# Patient Record
Sex: MALE | Race: WHITE | NOT HISPANIC OR LATINO | Employment: OTHER | URBAN - METROPOLITAN AREA
[De-identification: names, ages, dates, MRNs, and addresses within clinical notes are randomized per-mention and may not be internally consistent; named-entity substitution may affect disease eponyms.]

---

## 2023-10-30 DIAGNOSIS — I10 BENIGN ESSENTIAL HYPERTENSION: Primary | ICD-10-CM

## 2023-10-30 PROBLEM — R00.2 PALPITATIONS: Status: ACTIVE | Noted: 2023-10-30

## 2023-10-30 PROBLEM — E66.01 MORBIDLY OBESE (MULTI): Status: ACTIVE | Noted: 2023-10-30

## 2023-10-30 PROBLEM — E78.5 HYPERLIPIDEMIA: Status: ACTIVE | Noted: 2023-10-30

## 2023-10-30 PROBLEM — R06.02 SOB (SHORTNESS OF BREATH) ON EXERTION: Status: ACTIVE | Noted: 2023-10-30

## 2023-10-30 PROBLEM — I48.0 PAROXYSMAL ATRIAL FIBRILLATION (MULTI): Status: ACTIVE | Noted: 2023-10-30

## 2023-10-30 PROBLEM — R55 NEAR SYNCOPE: Status: ACTIVE | Noted: 2023-10-30

## 2023-10-30 PROBLEM — I48.11 LONGSTANDING PERSISTENT ATRIAL FIBRILLATION (MULTI): Status: ACTIVE | Noted: 2023-10-30

## 2023-10-30 PROBLEM — Z95.0 PACEMAKER: Status: ACTIVE | Noted: 2023-10-30

## 2023-10-30 PROBLEM — I49.5 SICK SINUS SYNDROME (MULTI): Status: ACTIVE | Noted: 2023-10-30

## 2023-10-30 RX ORDER — LISINOPRIL 20 MG/1
1 TABLET ORAL DAILY
COMMUNITY

## 2023-10-30 RX ORDER — SOTALOL HYDROCHLORIDE 120 MG/1
1 TABLET ORAL 2 TIMES DAILY
COMMUNITY
End: 2024-03-28 | Stop reason: SDUPTHER

## 2023-10-30 RX ORDER — METOPROLOL SUCCINATE 100 MG/1
100 TABLET, EXTENDED RELEASE ORAL DAILY
Qty: 90 TABLET | Refills: 3 | Status: SHIPPED | OUTPATIENT
Start: 2023-10-30 | End: 2024-10-29

## 2023-10-30 RX ORDER — BEMPEDOIC ACID AND EZETIMIBE 180; 10 MG/1; MG/1
1 TABLET, FILM COATED ORAL DAILY
COMMUNITY
End: 2023-12-20 | Stop reason: ALTCHOICE

## 2023-10-30 RX ORDER — FAMOTIDINE 20 MG/1
1 TABLET, FILM COATED ORAL 2 TIMES DAILY
COMMUNITY

## 2023-10-30 RX ORDER — DEXTROMETHORPHAN HYDROBROMIDE, GUAIFENESIN 5; 100 MG/5ML; MG/5ML
LIQUID ORAL
COMMUNITY

## 2023-10-30 RX ORDER — METOPROLOL SUCCINATE 100 MG/1
0.5 TABLET, EXTENDED RELEASE ORAL DAILY
COMMUNITY
End: 2023-10-30 | Stop reason: SDUPTHER

## 2023-10-30 RX ORDER — DIGOXIN 125 MCG
1 TABLET ORAL EVERY OTHER DAY
COMMUNITY

## 2023-10-30 RX ORDER — IBUPROFEN 600 MG/1
1 TABLET ORAL
COMMUNITY

## 2023-10-30 RX ORDER — LEVOTHYROXINE SODIUM 75 UG/1
1 TABLET ORAL DAILY
COMMUNITY

## 2023-10-30 RX ORDER — SERTRALINE HYDROCHLORIDE 100 MG/1
1 TABLET, FILM COATED ORAL DAILY
COMMUNITY

## 2023-12-20 ENCOUNTER — HOSPITAL ENCOUNTER (OUTPATIENT)
Dept: CARDIOLOGY | Facility: HOSPITAL | Age: 63
Discharge: HOME | End: 2023-12-20
Payer: MEDICARE

## 2023-12-20 ENCOUNTER — OFFICE VISIT (OUTPATIENT)
Dept: CARDIOLOGY | Facility: CLINIC | Age: 63
End: 2023-12-20
Payer: MEDICARE

## 2023-12-20 VITALS
WEIGHT: 302.2 LBS | BODY MASS INDEX: 40.05 KG/M2 | HEART RATE: 73 BPM | SYSTOLIC BLOOD PRESSURE: 140 MMHG | HEIGHT: 73 IN | DIASTOLIC BLOOD PRESSURE: 72 MMHG

## 2023-12-20 DIAGNOSIS — Z87.891 FORMER SMOKER: ICD-10-CM

## 2023-12-20 DIAGNOSIS — Z79.01 CURRENT USE OF LONG TERM ANTICOAGULATION: ICD-10-CM

## 2023-12-20 DIAGNOSIS — Z95.0 CARDIAC PACEMAKER IN SITU: ICD-10-CM

## 2023-12-20 DIAGNOSIS — Z79.899 HIGH RISK MEDICATION USE: ICD-10-CM

## 2023-12-20 DIAGNOSIS — I49.5 SINOATRIAL NODE DYSFUNCTION (MULTI): ICD-10-CM

## 2023-12-20 DIAGNOSIS — I48.11 LONGSTANDING PERSISTENT ATRIAL FIBRILLATION (MULTI): Primary | ICD-10-CM

## 2023-12-20 DIAGNOSIS — E78.5 HYPERLIPIDEMIA: ICD-10-CM

## 2023-12-20 DIAGNOSIS — I10 BENIGN ESSENTIAL HYPERTENSION: ICD-10-CM

## 2023-12-20 DIAGNOSIS — I49.5 SICK SINUS SYNDROME (MULTI): ICD-10-CM

## 2023-12-20 DIAGNOSIS — R00.2 PALPITATIONS: ICD-10-CM

## 2023-12-20 DIAGNOSIS — Z95.0 PACEMAKER: ICD-10-CM

## 2023-12-20 PROBLEM — F17.200 TOBACCO DEPENDENCE: Status: ACTIVE | Noted: 2023-12-20

## 2023-12-20 PROBLEM — N20.0 NEPHROLITHIASIS: Status: ACTIVE | Noted: 2023-12-20

## 2023-12-20 PROBLEM — F41.9 ANXIETY: Status: ACTIVE | Noted: 2023-12-20

## 2023-12-20 PROBLEM — I48.0 PAROXYSMAL ATRIAL FIBRILLATION (MULTI): Status: RESOLVED | Noted: 2023-10-30 | Resolved: 2023-12-20

## 2023-12-20 PROCEDURE — 99214 OFFICE O/P EST MOD 30 MIN: CPT | Performed by: INTERNAL MEDICINE

## 2023-12-20 PROCEDURE — 93000 ELECTROCARDIOGRAM COMPLETE: CPT | Performed by: INTERNAL MEDICINE

## 2023-12-20 PROCEDURE — 1036F TOBACCO NON-USER: CPT | Performed by: INTERNAL MEDICINE

## 2023-12-20 PROCEDURE — 93280 PM DEVICE PROGR EVAL DUAL: CPT | Performed by: INTERNAL MEDICINE

## 2023-12-20 PROCEDURE — 3008F BODY MASS INDEX DOCD: CPT | Performed by: INTERNAL MEDICINE

## 2023-12-20 PROCEDURE — 3077F SYST BP >= 140 MM HG: CPT | Performed by: INTERNAL MEDICINE

## 2023-12-20 PROCEDURE — 1159F MED LIST DOCD IN RCRD: CPT | Performed by: INTERNAL MEDICINE

## 2023-12-20 PROCEDURE — 93280 PM DEVICE PROGR EVAL DUAL: CPT

## 2023-12-20 PROCEDURE — 3078F DIAST BP <80 MM HG: CPT | Performed by: INTERNAL MEDICINE

## 2023-12-20 PROCEDURE — 93290 INTERROG DEV EVAL ICPMS IP: CPT | Performed by: INTERNAL MEDICINE

## 2023-12-20 RX ORDER — RIVAROXABAN 20 MG/1
20 TABLET, FILM COATED ORAL DAILY
COMMUNITY
Start: 2023-12-04

## 2023-12-20 RX ORDER — ASPIRIN 81 MG/1
81 TABLET ORAL DAILY
COMMUNITY
Start: 2023-08-03

## 2023-12-20 RX ORDER — HYDROCHLOROTHIAZIDE 12.5 MG/1
12.5 CAPSULE ORAL
COMMUNITY
Start: 2023-10-29

## 2023-12-20 NOTE — PATIENT INSTRUCTIONS
Patient to follow up in 6 months with HELEN Ray with device check in clinic.     No other changes today.   Continue same medications and treatments.   Patient educated on proper medication use.   Patient educated on risk factor modification.   Please bring any lab results from other providers / physicians to your next appointment.     Please bring all medicines, vitamins, and herbal supplements with you when you come to the office.     Prescriptions will not be filled unless you are compliant with your follow up appointments or have a follow up appointment scheduled as per instruction of your physician. Refills should be requested at the time of your visit.    IBenny RN am scribing for and in the presence of Dr. Ren Berger MD

## 2023-12-20 NOTE — PROGRESS NOTES
CARDIOLOGY OFFICE VISIT      CHIEF COMPLAINT  Chief Complaint   Patient presents with    Follow-up     Patient here for 6 month follow up        HISTORY OF PRESENT ILLNESS  HPI    63-year-old  male who is followed for sinus node dysfunction status post dual-chamber pacemaker implant on October 19, 2018, paroxysmal atrial fibrillation controlled on a combination of sotalol, digoxin, metoprolol, and anticoagulated with Xarelto, hypothyroidism on replacement therapy, dyslipidemia on statin, hypertension and class II obesity.    Since the last office visit he has been doing well.  Denies any symptoms of chest pain or shortness of breath or palpitations.    Device interrogation performed today at the device clinic shows battery longevity 8 years.  No evidence of atrial fibrillation with 1 brief episode of nonsustained ventricular tachycardia lasting 2 seconds with duration rate of 170 bpm asymptomatic.    EKG performed today shows atrial paced rhythm rate of 73 bpm QRS duration 86 ms QT corrected 151 ms.  Rhythm strip shows the same pattern.        Past Medical History  History reviewed. No pertinent past medical history.    Social History  Social History     Tobacco Use    Smoking status: Former     Types: Cigarettes     Passive exposure: Never    Smokeless tobacco: Never   Substance Use Topics    Alcohol use: Not Currently    Drug use: Never       Family History   No family history on file.     Allergies:  No Known Allergies     Outpatient Medications:  Current Outpatient Medications   Medication Instructions    acetaminophen (Tylenol Arthritis Pain) 650 mg ER tablet take 2 tablets as needed    aspirin 81 mg, oral, Daily    digoxin (Lanoxin) 125 MCG tablet 1 tablet, oral, Every other day    famotidine (Pepcid) 20 mg tablet 1 tablet, oral, 2 times daily    hydroCHLOROthiazide (MICROZIDE) 12.5 mg, oral, Daily before breakfast    ibuprofen 600 mg tablet 1 tablet, oral, 2 times daily with meals    levothyroxine  (Synthroid, Levoxyl) 75 mcg tablet 1 tablet, oral, Daily    lisinopril 20 mg tablet 1 tablet, oral, Daily    metoprolol succinate XL (TOPROL-XL) 100 mg, oral, Daily    sertraline (Zoloft) 100 mg tablet 1 tablet, oral, Daily    sotalol (Betapace) 120 mg tablet 1 tablet, oral, 2 times daily    Xarelto 20 mg, oral, Daily          REVIEW OF SYSTEMS  Review of Systems   All other systems reviewed and are negative.        VITALS  Vitals:    12/20/23 1215   BP: 140/72   Pulse: 73       PHYSICAL EXAM  Vitals reviewed.   Constitutional:       Appearance: Normal and healthy appearance. Well-developed and not in distress.   Eyes:      Conjunctiva/sclera: Conjunctivae normal.      Pupils: Pupils are equal, round, and reactive to light.   Neck:      Vascular: No JVR. JVD normal.   Pulmonary:      Effort: Pulmonary effort is normal.      Breath sounds: Normal breath sounds. No wheezing. No rhonchi. No rales.   Chest:      Chest wall: Not tender to palpatation.          Comments: Left sided device pocket- healed and well approximated. No lump or hematoma     Cardiovascular:      PMI at left midclavicular line. Normal rate. Regular rhythm. Normal S1. Normal S2.       Murmurs: There is no murmur.      No gallop.  No click. No rub.   Pulses:     Intact distal pulses.   Edema:     Peripheral edema absent.   Abdominal:      Tenderness: There is no abdominal tenderness.   Musculoskeletal: Normal range of motion.         General: No tenderness.      Cervical back: Normal range of motion. Skin:     General: Skin is warm and dry.   Neurological:      General: No focal deficit present.      Mental Status: Alert and oriented to person, place and time.   Psychiatric:         Behavior: Behavior is cooperative.           ASSESSMENT AND PLAN    Clinical impressions:  1. Sinus node dysfunction status post dual-chamber pacemaker implant ([employer] Neha XT DR MRI) on October 19, 2018.  2. Paroxysmal atrial tachycardia controlled on sotalol and  anticoagulated with Xarelto.  3. Nonsustained ventricular tachycardia on sotalol.  4. Hypothyroidism on replacement therapy.  5. Hypertension, controlled   6. Class II obesity with a BMI of 39.60.  7. Left heart catheterization dated June 22, 2018 revealing mild diffuse disease of the mid LAD and first diagonal with recommendation for medical management and ejection fraction of 50%.  8. 2D echocardiogram dated March 18, 2020 revealing an ejection fraction of 60% with trace TR and left atrial enlargement the left atrial size of 4.1 cm.      Plan-recommendations    From the electrophysiology standpoint he is doing well.  Will continue with current medical therapy that includes high risk medication (sotalol) Xarelto therapy and beta-blocker therapy.    Continue with digoxin therapy.    Follow my office every 6 months or sooner if needed.    Follow device clinic as scheduled to continue watching the burden of atrial fibrillation by device interrogations.    Risk factor modification and lifestyle modification discussed with patient. Diet , exercise and hydration discussed with patient.    I have personally review with patient during this office visit, laboratory data, echocardiogram results, stress test results, Holter-event monitor results prior and after the last electrophysiology visit. All questions has been answered.    Please excuse any errors in grammar or translation related to this dictation.  Voice recognition software was utilized to prepare this document.

## 2024-03-27 ENCOUNTER — HOSPITAL ENCOUNTER (OUTPATIENT)
Dept: CARDIOLOGY | Facility: HOSPITAL | Age: 64
Discharge: HOME | End: 2024-03-27
Payer: MEDICARE

## 2024-03-27 DIAGNOSIS — Z95.0 PACEMAKER: ICD-10-CM

## 2024-03-27 PROCEDURE — 93296 REM INTERROG EVL PM/IDS: CPT

## 2024-03-27 PROCEDURE — 93294 REM INTERROG EVL PM/LDLS PM: CPT | Performed by: INTERNAL MEDICINE

## 2024-03-28 DIAGNOSIS — I48.11 LONGSTANDING PERSISTENT ATRIAL FIBRILLATION (MULTI): ICD-10-CM

## 2024-03-28 RX ORDER — SOTALOL HYDROCHLORIDE 120 MG/1
120 TABLET ORAL 2 TIMES DAILY
Qty: 180 TABLET | Refills: 1 | Status: SHIPPED | OUTPATIENT
Start: 2024-03-28

## 2024-06-19 ENCOUNTER — HOSPITAL ENCOUNTER (OUTPATIENT)
Dept: CARDIOLOGY | Facility: HOSPITAL | Age: 64
Discharge: HOME | End: 2024-06-19
Payer: MEDICARE

## 2024-06-19 ENCOUNTER — APPOINTMENT (OUTPATIENT)
Dept: CARDIOLOGY | Facility: CLINIC | Age: 64
End: 2024-06-19
Payer: MEDICARE

## 2024-06-19 VITALS
SYSTOLIC BLOOD PRESSURE: 122 MMHG | HEIGHT: 73 IN | BODY MASS INDEX: 41.22 KG/M2 | DIASTOLIC BLOOD PRESSURE: 78 MMHG | WEIGHT: 311 LBS | HEART RATE: 80 BPM

## 2024-06-19 DIAGNOSIS — I48.11 LONGSTANDING PERSISTENT ATRIAL FIBRILLATION (MULTI): ICD-10-CM

## 2024-06-19 DIAGNOSIS — I10 BENIGN ESSENTIAL HYPERTENSION: ICD-10-CM

## 2024-06-19 DIAGNOSIS — R00.2 PALPITATIONS: ICD-10-CM

## 2024-06-19 DIAGNOSIS — R06.02 SOB (SHORTNESS OF BREATH) ON EXERTION: ICD-10-CM

## 2024-06-19 DIAGNOSIS — E78.2 MIXED HYPERLIPIDEMIA: ICD-10-CM

## 2024-06-19 DIAGNOSIS — Z95.0 PACEMAKER: ICD-10-CM

## 2024-06-19 DIAGNOSIS — Z95.0 PACEMAKER: Primary | ICD-10-CM

## 2024-06-19 DIAGNOSIS — E66.01 MORBIDLY OBESE (MULTI): ICD-10-CM

## 2024-06-19 DIAGNOSIS — I49.5 SICK SINUS SYNDROME (MULTI): ICD-10-CM

## 2024-06-19 PROCEDURE — 1036F TOBACCO NON-USER: CPT | Performed by: NURSE PRACTITIONER

## 2024-06-19 PROCEDURE — 93280 PM DEVICE PROGR EVAL DUAL: CPT

## 2024-06-19 PROCEDURE — 1159F MED LIST DOCD IN RCRD: CPT | Performed by: NURSE PRACTITIONER

## 2024-06-19 PROCEDURE — 93280 PM DEVICE PROGR EVAL DUAL: CPT | Performed by: INTERNAL MEDICINE

## 2024-06-19 PROCEDURE — 99214 OFFICE O/P EST MOD 30 MIN: CPT | Performed by: NURSE PRACTITIONER

## 2024-06-19 PROCEDURE — 3078F DIAST BP <80 MM HG: CPT | Performed by: NURSE PRACTITIONER

## 2024-06-19 PROCEDURE — 1160F RVW MEDS BY RX/DR IN RCRD: CPT | Performed by: NURSE PRACTITIONER

## 2024-06-19 PROCEDURE — 3008F BODY MASS INDEX DOCD: CPT | Performed by: NURSE PRACTITIONER

## 2024-06-19 PROCEDURE — 93000 ELECTROCARDIOGRAM COMPLETE: CPT | Mod: DISTINCT PROCEDURAL SERVICE | Performed by: INTERNAL MEDICINE

## 2024-06-19 PROCEDURE — 3074F SYST BP LT 130 MM HG: CPT | Performed by: NURSE PRACTITIONER

## 2024-06-19 RX ORDER — FLUTICASONE FUROATE, UMECLIDINIUM BROMIDE AND VILANTEROL TRIFENATATE 100; 62.5; 25 UG/1; UG/1; UG/1
1 POWDER RESPIRATORY (INHALATION) DAILY
COMMUNITY
Start: 2024-03-15

## 2024-06-19 NOTE — PROGRESS NOTES
"CARDIOLOGY OFFICE VISIT      CHIEF COMPLAINT  Chief Complaint   Patient presents with    Device Check     Chief complaint: \"I cannot seem to lose any weight.\"  HISTORY OF PRESENT ILLNESS  HPI  History: The patient is a 63-year-old  male who is followed for sinus node dysfunction status post dual-chamber pacemaker implant on October 19, 2018, paroxysmal atrial fibrillation controlled on a combination of sotalol, metoprolol, digoxin, and anticoagulated with Xarelto, hypothyroidism on replacement therapy and dyslipidemia on statin.  He states he was placed on Ozempic for management of diabetes and lost 10 pounds initially but his weight has plateaued and he has not lost any additional weight.  He denies chest pain, palpitations, dizziness, lightheadedness, shortness of breath, abdominal distention, or right lower extremity swelling.  He states that he did have surgery on his left foot and does occasionally experience swelling which is relieved with elevating the extremity.  Past Medical History  History reviewed. No pertinent past medical history.    Social History  Social History     Tobacco Use    Smoking status: Former     Types: Cigarettes     Passive exposure: Never    Smokeless tobacco: Never   Substance Use Topics    Alcohol use: Not Currently    Drug use: Never       Family History     Family History   Problem Relation Name Age of Onset    Atrial fibrillation Mother      Atrial fibrillation Brother          Allergies:  No Known Allergies     Outpatient Medications:  Current Outpatient Medications   Medication Instructions    acetaminophen (Tylenol Arthritis Pain) 650 mg ER tablet take 2 tablets as needed    aspirin 81 mg, oral, Daily    digoxin (Lanoxin) 125 MCG tablet 1 tablet, oral, Every other day    famotidine (Pepcid) 20 mg tablet 1 tablet, oral, 2 times daily    hydroCHLOROthiazide (MICROZIDE) 12.5 mg, oral, Daily before breakfast    ibuprofen 600 mg tablet 1 tablet, oral, 2 times daily (morning " and late afternoon)    levothyroxine (Synthroid, Levoxyl) 75 mcg tablet 1 tablet, oral, Daily    lisinopril 20 mg tablet 1 tablet, oral, Daily    metoprolol succinate XL (TOPROL-XL) 100 mg, oral, Daily    sertraline (Zoloft) 100 mg tablet 1 tablet, oral, Daily    sotalol (BETAPACE) 120 mg, oral, 2 times daily    Trelegy Ellipta 100-62.5-25 mcg blister with device 1 puff, inhalation, Daily    Xarelto 20 mg, oral, Daily          REVIEW OF SYSTEMS  Review of Systems   All other systems reviewed and are negative.        VITALS  Vitals:    06/19/24 1027   BP: 122/78   Pulse: 80       PHYSICAL EXAM  Vitals and nursing note reviewed.   Constitutional:       Appearance: Normal appearance.   HENT:      Head: Normocephalic.   Neck:      Vascular: No JVD. Carotid upstrokes II/IV.  Cardiovascular:      Rate and Rhythm: Normal rate and regular rhythm.      Pulses: Normal pulses.      Heart sounds: Normal S1 S2, no S3 S4.  No murmurs or rubs.  Pulmonary:      Effort: Pulmonary effort is normal. Respirations regular and nonlabored.     Breath sounds: Clear to auscultation posterior laterally.  Abdominal:      General: Bowel sounds are normal.      Palpations: Abdomen is soft.   Musculoskeletal:         General: Normal range of motion.      Cervical back: Normal range of motion.   Skin:     General: Skin is warm and dry.   Neurological:      General: No focal deficit present.      Mental Status: Alert and oriented to person, place, and time.      Motor: Motor function is intact.   Psychiatric:         Attention and Perception: Attention and perception normal.         Mood and Affect: Mood and affect normal.         Speech: Speech normal.         Behavior: Behavior normal. Behavior is cooperative.         Thought Content: Thought content normal.         Cognition and Memory: Cognition and memory normal.     Labs and testing: Twelve-lead EKG reveals atrial pacing and ventricular tracking at 80 bpm, prolonged AV conduction with a WA  interval of 224 ms, QRS durations 88 ms,  ms, QTc 440 ms.  No acute ischemic changes or infarct patterns are noted.  Pacemaker interrogation dated June 19, 2024 reveals atrial pacing 78.23% and ventricular pacing 1.5% percent.  1 AT/AF episode was noted on May 2, 2024 lasting 53 seconds in duration.  The AT/AF burden is 5.7%.  1 SVT event was noted on May 24, 2024 lasting 2 seconds at a rate of 161 bpm.  Good sensing and capture thresholds.  Estimated battery longevity is 7 years and 7 months.  2D echo dated July 8, 2023 revealed an ejection fraction of 50 to 55%, moderate concentric LVH, mild left atrial enlargement, trace TR.      ASSESSMENT AND PLAN    Clinical impressions:  1. Sinus node dysfunction status post dual-chamber pacemaker implant ([employer] Neha XT DR MRI) on October 19, 2018.  2. Paroxysmal atrial tachycardia controlled on sotalol and anticoagulated with Xarelto.  3. Nonsustained ventricular tachycardia on sotalol.  4. Hypothyroidism on replacement therapy.  5. Hypertension, controlled with a blood pressure today 122/78.  6. Class II obesity with a BMI of 41.03.  7. Left heart catheterization dated June 22, 2018 revealing mild diffuse disease of the mid LAD and first diagonal with recommendation for medical management and ejection fraction of 50%.  8. 2D echocardiogram dated July 8, 2023 revealed an ejection fraction of 50 to 55%, moderate concentric LVH, mild left atrial enlargement, trace tricuspid regurgitation.  Recommendations:  1.  Continue current medications as prescribed.  2.  Lifestyle modifications were discussed for arrhythmia management.  Patient was encouraged to increase water consumption to 64 ounces per day.  Patient states that he does not drink water.  He reports that he drinks tea, sports drinks and milk.  We discussed restricting sports drinks and tea.  Activity and exercise was also encouraged for weight loss.  Patient was instructed to try eating 6 small meals per day  as opposed to 3 large meals and snacking.  We discussed healthy snacks as opposed to chips.  3.  Obtain a remote device interrogation on September 25, 2024 and in clinic check in 6 months prior to the office visit with Dr. Berger.  4.  Follow-up in office with Dr. Berger in 6 months or sooner if needed.    Evaluation and note by Flor Hebert, CNP  **Please excuse any errors in grammar or translation related to this dictation.  Voice recognition software was utilized to prepare this document.**

## 2024-08-22 ENCOUNTER — HOSPITAL ENCOUNTER (EMERGENCY)
Age: 64
Discharge: HOME | End: 2024-08-22
Payer: MEDICARE

## 2024-08-22 VITALS — OXYGEN SATURATION: 97 % | DIASTOLIC BLOOD PRESSURE: 98 MMHG | SYSTOLIC BLOOD PRESSURE: 162 MMHG | HEART RATE: 70 BPM

## 2024-08-22 VITALS
OXYGEN SATURATION: 97 % | HEART RATE: 98 BPM | SYSTOLIC BLOOD PRESSURE: 168 MMHG | DIASTOLIC BLOOD PRESSURE: 112 MMHG | TEMPERATURE: 97.52 F

## 2024-08-22 VITALS — BODY MASS INDEX: 40.9 KG/M2
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
## 2024-08-22 DIAGNOSIS — J32.9: ICD-10-CM

## 2024-08-22 DIAGNOSIS — S01.01XA: Primary | ICD-10-CM

## 2024-08-22 PROCEDURE — 99284 EMERGENCY DEPT VISIT MOD MDM: CPT

## 2024-08-22 PROCEDURE — 12001 RPR S/N/AX/GEN/TRNK 2.5CM/<: CPT

## 2024-08-22 PROCEDURE — 70450 CT HEAD/BRAIN W/O DYE: CPT

## 2024-08-22 PROCEDURE — 72125 CT NECK SPINE W/O DYE: CPT

## 2024-08-22 NOTE — CT_ITS
The [address] [address] 
     [phone] 
  
  
Patient Name: 
BEBO HADDAD 
  
MRN: [id number]    YOB: 1960    Sex: M 
Assigned Patient Location: ER 
Current Patient Location: ED.MAIN 
Accession/Order Number: [id number] 
Exam Date: 8/22/2024  04:52    Report Date: 8/22/2024  05:20 
  
At the request of: 
MERLYN MIRANDA   
  
Procedure:  CT head/brain wo con 
  
EXAMINATION: CT head/brain wo con  
  
HISTORY: injury 
  
COMPARISON: No relevant comparison available.  
  
TECHNIQUE: Axial CT images were obtained without IV contrast. Dose reduction  
techniques were achieved by using automated exposure control and/or adjustment  
  
of mA and/or kV according to patient size and/or use of iterative  
reconstruction technique. 
  
FINDINGS:  
BRAIN: No edema, hemorrhage, mass, acute infarction, or inappropriate atrophy.  
  
CSF SPACES: No hydrocephalus, subarachnoid hemorrhage, or mass. Appropriate  
for  
age.  
SKULL: No fracture, mass, or other significant visible lesion.  
SINUSES: Complete consolidation of right sphenoid sinus.  
ORBITS: No appreciable abnormality on the limited views.  
OTHER: Mild subcutaneous bruising/edema midline posterior to the vertex.  
  
ORDER #: [id number] CT/CT head/brain wo con  
IMPRESSION:   
   
1. No intracranial hemorrhage or acute abnormality brain.  
2. No fracture of the calvarium.  
3. Posterior midline scalp mild bruising/edema.  
4. Complete opacification the right sphenoid sinus; acute versus chronic   
sinusitis.  
   
   
Electronically authenticated by: APPLE ARZOLA   Date: 8/22/2024  05:20

## 2024-08-22 NOTE — CT_ITS
The [address] [address] 
     [phone] 
  
  
Patient Name: 
BEBO HADDAD 
  
MRN: [id number]    YOB: 1960    Sex: M 
Assigned Patient Location: ED.MAIN 
Current Patient Location:  
Accession/Order Number: [id number] 
Exam Date: 8/22/2024  04:52    Report Date: 8/22/2024  05:25 
  
At the request of: 
MERLYN MIRANDA   
  
Procedure:  CT cervical spine wo con 
  
EXAMINATION: CT cervical spine wo con  
  
HISTORY: injury 
  
COMPARISON: No relevant comparison available.  
  
TECHNIQUE: Axial, Coronal, and Sagittal images were created without IV  
contrast. Dose reduction techniques were achieved by using automated exposure  
control and/or adjustment of mA and/or kV according to patient size and/or use  
  
of iterative reconstruction technique. 
  
FINDINGS:  
VERTEBRAL BODIES: Reversal normal lordotic curvature. No fracture. Minimal  
grade 1 anterolisthesis of C4 on 5.  
FACET JOINTS: Multilevel mild degenerative facet arthropathy. No disruption or  
  
abnormal widening. 
DISCS: Moderate narrowing C5-C6. Multilevel mild narrowing.  
CENTRAL CANAL: No appreciable significant spinal stenosis or evidence of  
hemorrhage. 
PARASPINAL AREA: No visible mass.  
  
ORDER #: [id number] CT/CT cervical spine wo con  
IMPRESSION:   
   
1. Reversal normal lordotic curvature; positioning versus muscle spasm.  
2. No appreciable acute abnormality.  
3. Multilevel mild to moderate degenerative changes.   
   
   
Electronically authenticated by: APPLE ARZOLA   Date: 8/22/2024  05:25

## 2024-08-22 NOTE — ED_ITS
HPI    
HPI - Fall    
General    
Chief Complaint: Fall    
Stated Complaint: FALL HEAD INJURY    
Time Seen by Provider: 08/22/24 04:31    
Source: patient    
Mode of arrival: walk-in    
Limitations: no limitations    
History of Present Illness    
HPI Narrative:     
fell out of bed and struck back of his head. lac to occipital  scalp. tetanus   
UTD. states he was dreaming and this is why he fell out of bed. No complaint of   
headache or neck pain. no numbness or extremity weakness    
Related Data    
                                    Allergies    
    
    
    
Allergy/AdvReac Type Severity Reaction Status Date / Time    
     
No Known Drug Allergies Allergy   Verified 08/22/24 04:20    
    
    
    
    
Opioid HPI    
Opioid Management    
Most Recent Pain and Opioid Data:     
2    
    
                                        No Data to Display    
    
    
    
Review of Systems    
2    
    
ROS0      
    
 Status of ROS                          10 or more systems reviewed and unremark    
able except as noted in     
history and below       
    
    
Exam    
Constitutional    
Vital Signs, click to edit/add:     
    
                                Last Vital Signs    
    
    
    
Temp  97.6 F   08/22/24 04:15    
     
Pulse  98 H  08/22/24 04:15    
     
Resp  20   08/22/24 04:15    
     
BP  168/112 H  08/22/24 04:15    
     
Pulse Ox  97   08/22/24 04:15    
     
O2 Del Method  Room Air  08/22/24 04:15    
    
    
    
    
Common normals: no apparent distress, average body habitus, oriented x3, no   
limitations, healthy appearing, alert and well nourished    
[facility]    
Head images: 2    
    
 1. occipital lac    
    
    
Eye    
Common normals: PERRL and EOMs intact bilaterally    
Respiratory    
Common normals: normal respiratory effort, no retractions, no use of accessory   
muscles and clear to auscultation bilaterally    
Cardio    
Common normals: regular rate, regular rhythm, S1 normal heart sound and S2   
normal heart sound    
Extremity    
Common normals: normal to inspection and full ROM    
Neuro    
Common normals: oriented x3, CN's II-XII intact bilaterally, moves all   
extremities and no focal motor deficits    
Psych    
Appearance: grossly normal    
    
Course    
Vital Signs    
Vital signs:     
    
                                   Vital Signs    
    
    
    
Temperature  97.6 F   08/22/24 04:15    
     
Pulse Rate  98 H  08/22/24 04:15    
     
Respiratory Rate  20   08/22/24 04:15    
     
Blood Pressure  168/112 H  08/22/24 04:15    
     
Pulse Oximetry  97   08/22/24 04:15    
     
Oxygen Delivery Method  Room Air  08/22/24 04:15    
    
    
                                            
    
    
    
Temperature  97.6 F   08/22/24 04:15    
     
Pulse Rate  98 H  08/22/24 04:15    
     
Respiratory Rate  20   08/22/24 04:15    
     
Blood Pressure  168/112 H  08/22/24 04:15    
     
Pulse Oximetry  97   08/22/24 04:15    
     
Oxygen Delivery Method  Room Air  08/22/24 04:15    
    
    
    
    
    
MDM - Fall    
MDM Narrative    
Medical decision making narrative:     
presented after he fell out of bed at home and lac the the back of his scalp.   
lac repaired. CTs with findings of sinusitis. Patient informed and discharged   
home with augmentin    
    
Discharge Plan    
Discharge    
Stand Alone Forms:  Work/School Release, Portal Instructions    
    
Chief Complaint: Fall    
    
Clinical Impression:    
 Laceration of scalp, Sinusitis    
    
    
Patient Disposition: Home, Self-Care    
    
Print Language: English    
    
Instructions:  Laceration (ED), Sinusitis (ED)    
    
Additional Instructions:    
have staples removed in 10 days.     
    
Referrals:    
ANGELA JAMA [Primary Care Provider] - 1 week    
    
    
Procedures    
ED Procedure Instructions    
Procedures    
Procedures:     
1.5cm superficial occipital scalp lac.    
site cleaned with betadine and rinsed with saline. closed with 2 staples

## 2024-08-22 NOTE — ED.FALL1
HPI
HPI - Fall
General
Chief Complaint: Fall
Stated Complaint: FALL HEAD INJURY
Time Seen by Provider: 08/22/24 04:31
Source: patient
Mode of arrival: walk-in
Limitations: no limitations
History of Present Illness
HPI Narrative: 
fell out of bed and struck back of his head. lac to occipital  scalp. tetanus UTD. states he was dreaming and this is why he fell out of bed. No complaint of headache or neck pain. no numbness or extremity weakness
Related Data
Allergies

Allergy/AdvReac Type Severity Reaction Status Date / Time
No Known Drug Allergies Allergy   Verified 08/22/24 04:20



Opioid HPI
Opioid Management
Most Recent Pain and Opioid Data: 
      No Data to Display


Review of Systems
ROS  
 Status of ROS 10 or more systems reviewed and unremarkable except as noted in history and below   

Exam
Constitutional
Vital Signs, click to edit/add: 

Last Vital Signs

Temp  97.6 F   08/22/24 04:15
Pulse  98 H  08/22/24 04:15
Resp  20   08/22/24 04:15
BP  168/112 H  08/22/24 04:15
Pulse Ox  97   08/22/24 04:15
O2 Del Method  Room Air  08/22/24 04:15



Common normals: no apparent distress, average body habitus, oriented x3, no limitations, healthy appearing, alert and well nourished
[facility]
Head images: 
 1. occipital lac

Eye
Common normals: PERRL and EOMs intact bilaterally
Respiratory
Common normals: normal respiratory effort, no retractions, no use of accessory muscles and clear to auscultation bilaterally
Cardio
Common normals: regular rate, regular rhythm, S1 normal heart sound and S2 normal heart sound
Extremity
Common normals: normal to inspection and full ROM
Neuro
Common normals: oriented x3, CN's II-XII intact bilaterally, moves all extremities and no focal motor deficits
Psych
Appearance: grossly normal

Course
Vital Signs
Vital signs: 

Vital Signs

Temperature  97.6 F   08/22/24 04:15
Pulse Rate  98 H  08/22/24 04:15
Respiratory Rate  20   08/22/24 04:15
Blood Pressure  168/112 H  08/22/24 04:15
Pulse Oximetry  97   08/22/24 04:15
Oxygen Delivery Method  Room Air  08/22/24 04:15



Temperature  97.6 F   08/22/24 04:15
Pulse Rate  98 H  08/22/24 04:15
Respiratory Rate  20   08/22/24 04:15
Blood Pressure  168/112 H  08/22/24 04:15
Pulse Oximetry  97   08/22/24 04:15
Oxygen Delivery Method  Room Air  08/22/24 04:15




MDM - Fall
MDM Narrative
Medical decision making narrative: 
presented after he fell out of bed at home and lac the the back of his scalp. lac repaired. CTs with findings of sinusitis. Patient informed and discharged home with augmentin

Discharge Plan
Discharge
Stand Alone Forms:  Work/School Release, Portal Instructions

Chief Complaint: Fall

Clinical Impression:
 Laceration of scalp, Sinusitis


Patient Disposition: Home, Self-Care

Print Language: English

Instructions:  Laceration (ED), Sinusitis (ED)

Additional Instructions:
have staples removed in 10 days. 

Referrals:
ANGELA JAMA [Primary Care Provider] - 1 week


Procedures
ED Procedure Instructions
Procedures
Procedures: 
1.5cm superficial occipital scalp lac.
site cleaned with betadine and rinsed with saline. closed with 2 staples

## 2024-09-23 DIAGNOSIS — I48.11 LONGSTANDING PERSISTENT ATRIAL FIBRILLATION (MULTI): Primary | ICD-10-CM

## 2024-09-23 RX ORDER — RIVAROXABAN 20 MG/1
20 TABLET, FILM COATED ORAL DAILY
Qty: 90 TABLET | Refills: 3 | Status: SHIPPED | OUTPATIENT
Start: 2024-09-23

## 2024-09-23 RX ORDER — SOTALOL HYDROCHLORIDE 120 MG/1
120 TABLET ORAL 2 TIMES DAILY
Qty: 180 TABLET | Refills: 3 | Status: SHIPPED | OUTPATIENT
Start: 2024-09-23

## 2024-09-23 NOTE — TELEPHONE ENCOUNTER
Patient's wife called requesting refills on sotalol 120mg twice daily and xarelto 20mg daily. Order placed and sent to provider for signature.

## 2024-09-25 ENCOUNTER — HOSPITAL ENCOUNTER (OUTPATIENT)
Dept: CARDIOLOGY | Facility: HOSPITAL | Age: 64
Discharge: HOME | End: 2024-09-25
Payer: MEDICARE

## 2024-09-25 DIAGNOSIS — Z95.0 CARDIAC PACEMAKER IN SITU: Primary | ICD-10-CM

## 2024-09-25 DIAGNOSIS — I49.5 SINOATRIAL NODE DYSFUNCTION (MULTI): ICD-10-CM

## 2024-09-25 DIAGNOSIS — Z95.0 CARDIAC PACEMAKER IN SITU: ICD-10-CM

## 2024-09-25 PROCEDURE — 93296 REM INTERROG EVL PM/IDS: CPT

## 2024-09-25 PROCEDURE — 93294 REM INTERROG EVL PM/LDLS PM: CPT | Performed by: INTERNAL MEDICINE

## 2024-10-22 DIAGNOSIS — I10 BENIGN ESSENTIAL HYPERTENSION: ICD-10-CM

## 2024-10-22 RX ORDER — METOPROLOL SUCCINATE 100 MG/1
100 TABLET, EXTENDED RELEASE ORAL DAILY
Qty: 90 TABLET | Refills: 3 | Status: SHIPPED | OUTPATIENT
Start: 2024-10-22 | End: 2025-10-22

## 2024-10-22 NOTE — TELEPHONE ENCOUNTER
Received request for prescription refill for patient.  Patient follows with Dr. Ren Berger MD and MARIE Hernandez-CNP     Request is for metoprolol  Is patient currently on medication- yes    Last OV- 6/19/24  Next OV- 12/18/24    Pended for signing and sent to provider.

## 2024-11-04 DIAGNOSIS — I48.11 LONGSTANDING PERSISTENT ATRIAL FIBRILLATION (MULTI): ICD-10-CM

## 2024-11-04 RX ORDER — DIGOXIN 125 MCG
125 TABLET ORAL EVERY OTHER DAY
Qty: 45 TABLET | Refills: 3 | Status: SHIPPED | OUTPATIENT
Start: 2024-11-04 | End: 2025-11-04

## 2024-11-04 NOTE — TELEPHONE ENCOUNTER
Received request for prescription refill for patient.  Patient follows with Dr. Ren Berger MD     Request is for digoxin   Is patient currently on medication- yes    Last OV- 6/19/24  Next OV- 12/18/24    Pended for signing and sent to provider.

## 2024-12-18 ENCOUNTER — HOSPITAL ENCOUNTER (OUTPATIENT)
Dept: CARDIOLOGY | Facility: HOSPITAL | Age: 64
Discharge: HOME | End: 2024-12-18
Payer: MEDICARE

## 2024-12-18 ENCOUNTER — APPOINTMENT (OUTPATIENT)
Dept: CARDIOLOGY | Facility: CLINIC | Age: 64
End: 2024-12-18
Payer: MEDICARE

## 2024-12-18 VITALS
HEIGHT: 73 IN | SYSTOLIC BLOOD PRESSURE: 130 MMHG | WEIGHT: 313.5 LBS | HEART RATE: 70 BPM | DIASTOLIC BLOOD PRESSURE: 70 MMHG | BODY MASS INDEX: 41.55 KG/M2

## 2024-12-18 DIAGNOSIS — Z95.0 PACEMAKER: ICD-10-CM

## 2024-12-18 DIAGNOSIS — Z87.891 FORMER SMOKER: ICD-10-CM

## 2024-12-18 DIAGNOSIS — I48.11 LONGSTANDING PERSISTENT ATRIAL FIBRILLATION (MULTI): Primary | ICD-10-CM

## 2024-12-18 DIAGNOSIS — I48.11 LONGSTANDING PERSISTENT ATRIAL FIBRILLATION (MULTI): ICD-10-CM

## 2024-12-18 DIAGNOSIS — I49.5 SICK SINUS SYNDROME (MULTI): ICD-10-CM

## 2024-12-18 PROBLEM — F17.200 TOBACCO DEPENDENCE: Status: RESOLVED | Noted: 2023-12-20 | Resolved: 2024-12-18

## 2024-12-18 PROCEDURE — 1036F TOBACCO NON-USER: CPT | Performed by: INTERNAL MEDICINE

## 2024-12-18 PROCEDURE — 93280 PM DEVICE PROGR EVAL DUAL: CPT | Performed by: INTERNAL MEDICINE

## 2024-12-18 PROCEDURE — 3078F DIAST BP <80 MM HG: CPT | Performed by: INTERNAL MEDICINE

## 2024-12-18 PROCEDURE — 3008F BODY MASS INDEX DOCD: CPT | Performed by: INTERNAL MEDICINE

## 2024-12-18 PROCEDURE — 93000 ELECTROCARDIOGRAM COMPLETE: CPT | Mod: DISTINCT PROCEDURAL SERVICE | Performed by: INTERNAL MEDICINE

## 2024-12-18 PROCEDURE — 1159F MED LIST DOCD IN RCRD: CPT | Performed by: INTERNAL MEDICINE

## 2024-12-18 PROCEDURE — 93280 PM DEVICE PROGR EVAL DUAL: CPT

## 2024-12-18 PROCEDURE — 99214 OFFICE O/P EST MOD 30 MIN: CPT | Performed by: INTERNAL MEDICINE

## 2024-12-18 PROCEDURE — 3075F SYST BP GE 130 - 139MM HG: CPT | Performed by: INTERNAL MEDICINE

## 2024-12-18 RX ORDER — ATORVASTATIN CALCIUM 40 MG/1
40 TABLET, FILM COATED ORAL NIGHTLY
COMMUNITY
Start: 2024-11-04

## 2024-12-18 ASSESSMENT — ENCOUNTER SYMPTOMS
COUGH: 0
SHORTNESS OF BREATH: 0
IRREGULAR HEARTBEAT: 0
WHEEZING: 0
SYNCOPE: 0
NEAR-SYNCOPE: 0
ORTHOPNEA: 0
PND: 0
CLAUDICATION: 0
CHILLS: 1
CARDIOVASCULAR NEGATIVE: 1
SNORING: 0

## 2024-12-18 NOTE — PATIENT INSTRUCTIONS
Keep a log of your blood pressure twice a day for 2 weeks.  The first check is 2 hours after morning meds, the second check is just before you go to bed at night.  Drop it off here in the office for Dr. Browning to review.  Decrease the salt in your diet, and increase your intake of water.  Avoid processed foods and increase your intake of fruits and vegetables.  Follow up in 6 months  Remote device checks will occur at 3 and 9 month intervals.  In clinic device checks are to be done every 6 months, on the same day of your appointment.  Continue same medications and treatments.   Patient educated on proper medication use.   Patient educated on risk factor modification.   Please bring any lab results from other providers / physicians to your next appointment.     Please bring all medicines, vitamins, and herbal supplements with you when you come to the office.     Prescriptions will not be filled unless you are compliant with your follow up appointments or have a follow up appointment scheduled as per instruction of your physician. Refills should be requested at the time of your visit.  IKATJA LPN, AM SCRIBING FOR, AND IN THE PRESENCE OF DR. SOLITARIO BROWNING MD

## 2025-02-05 ENCOUNTER — TELEPHONE (OUTPATIENT)
Dept: CARDIOLOGY | Facility: CLINIC | Age: 65
End: 2025-02-05
Payer: MEDICARE

## 2025-02-05 DIAGNOSIS — I10 BENIGN ESSENTIAL HYPERTENSION: ICD-10-CM

## 2025-02-05 NOTE — TELEPHONE ENCOUNTER
Called pt's wife back and left vm BP records have not been recv'd via fax that I can see in chart.  Gave fax number and asked her to re-send.  [place]

## 2025-02-05 NOTE — TELEPHONE ENCOUNTER
Spouse called and LM stating she faxed the patients BP records 2 weeks ago and has not heard back with an update on how to move forward with medications. Routed to Carmencita REYNA RN

## 2025-02-06 NOTE — TELEPHONE ENCOUNTER
Patient wife left message she sent BP readings again. Would like a call after Dr. Moreno.Tasked to Tana PIERSON.

## 2025-02-07 RX ORDER — HYDROCHLOROTHIAZIDE 25 MG/1
25 TABLET ORAL DAILY
Qty: 90 TABLET | Refills: 3 | Status: SHIPPED | OUTPATIENT
Start: 2025-02-07 | End: 2026-02-07

## 2025-02-07 NOTE — TELEPHONE ENCOUNTER
2/7/25  Dr. Berger reviewed BP's and would like pt. To increase hydrochlorothiazide 25mg once a day.  Left vm for pt. To call office regarding orders.  Med list updated, RX sent to [facility] in [place].  Pt. Is to check BP x 3 weeks on new dose, and fax results to [phone].  [place]

## 2025-03-25 ENCOUNTER — APPOINTMENT (OUTPATIENT)
Dept: CARDIOLOGY | Facility: HOSPITAL | Age: 65
End: 2025-03-25
Payer: MEDICARE

## 2025-03-25 ENCOUNTER — HOSPITAL ENCOUNTER (OUTPATIENT)
Dept: CARDIOLOGY | Facility: HOSPITAL | Age: 65
Discharge: HOME | End: 2025-03-25
Payer: MEDICARE

## 2025-03-25 DIAGNOSIS — I49.5 SINOATRIAL NODE DYSFUNCTION (MULTI): ICD-10-CM

## 2025-03-25 DIAGNOSIS — Z95.0 CARDIAC PACEMAKER IN SITU: ICD-10-CM

## 2025-03-25 PROCEDURE — 93294 REM INTERROG EVL PM/LDLS PM: CPT | Performed by: INTERNAL MEDICINE

## 2025-03-25 PROCEDURE — 93296 REM INTERROG EVL PM/IDS: CPT

## 2025-04-10 ENCOUNTER — OFFICE VISIT (OUTPATIENT)
Dept: CARDIOLOGY | Facility: CLINIC | Age: 65
End: 2025-04-10
Payer: MEDICARE

## 2025-04-10 VITALS
WEIGHT: 304 LBS | BODY MASS INDEX: 40.29 KG/M2 | HEART RATE: 58 BPM | HEIGHT: 73 IN | DIASTOLIC BLOOD PRESSURE: 68 MMHG | SYSTOLIC BLOOD PRESSURE: 108 MMHG

## 2025-04-10 DIAGNOSIS — E11.9 TYPE 2 DIABETES MELLITUS WITHOUT COMPLICATION, WITHOUT LONG-TERM CURRENT USE OF INSULIN: ICD-10-CM

## 2025-04-10 DIAGNOSIS — I10 BENIGN ESSENTIAL HYPERTENSION: ICD-10-CM

## 2025-04-10 DIAGNOSIS — E78.2 MIXED HYPERLIPIDEMIA: ICD-10-CM

## 2025-04-10 DIAGNOSIS — I49.5 SICK SINUS SYNDROME (MULTI): ICD-10-CM

## 2025-04-10 DIAGNOSIS — I48.11 LONGSTANDING PERSISTENT ATRIAL FIBRILLATION (MULTI): ICD-10-CM

## 2025-04-10 DIAGNOSIS — Z87.891 FORMER SMOKER: ICD-10-CM

## 2025-04-10 DIAGNOSIS — F41.9 ANXIETY: ICD-10-CM

## 2025-04-10 DIAGNOSIS — Z95.0 PACEMAKER: ICD-10-CM

## 2025-04-10 DIAGNOSIS — I47.29 NSVT (NONSUSTAINED VENTRICULAR TACHYCARDIA) (MULTI): ICD-10-CM

## 2025-04-10 DIAGNOSIS — I48.20 CHRONIC ATRIAL FIBRILLATION (MULTI): ICD-10-CM

## 2025-04-10 DIAGNOSIS — R00.2 PALPITATIONS: ICD-10-CM

## 2025-04-10 DIAGNOSIS — R06.02 SOB (SHORTNESS OF BREATH) ON EXERTION: ICD-10-CM

## 2025-04-10 DIAGNOSIS — G47.33 OBSTRUCTIVE SLEEP APNEA: ICD-10-CM

## 2025-04-10 DIAGNOSIS — R42 DIZZINESS: ICD-10-CM

## 2025-04-10 DIAGNOSIS — Z79.899 HIGH RISK MEDICATION USE: ICD-10-CM

## 2025-04-10 DIAGNOSIS — R07.9 CHEST PAIN, UNSPECIFIED TYPE: ICD-10-CM

## 2025-04-10 DIAGNOSIS — Z76.89 ENCOUNTER TO ESTABLISH CARE: ICD-10-CM

## 2025-04-10 PROCEDURE — 99215 OFFICE O/P EST HI 40 MIN: CPT | Performed by: INTERNAL MEDICINE

## 2025-04-10 PROCEDURE — 3074F SYST BP LT 130 MM HG: CPT | Performed by: INTERNAL MEDICINE

## 2025-04-10 PROCEDURE — 3008F BODY MASS INDEX DOCD: CPT | Performed by: INTERNAL MEDICINE

## 2025-04-10 PROCEDURE — 1159F MED LIST DOCD IN RCRD: CPT | Performed by: INTERNAL MEDICINE

## 2025-04-10 PROCEDURE — 4010F ACE/ARB THERAPY RXD/TAKEN: CPT | Performed by: INTERNAL MEDICINE

## 2025-04-10 PROCEDURE — 1036F TOBACCO NON-USER: CPT | Performed by: INTERNAL MEDICINE

## 2025-04-10 PROCEDURE — 3078F DIAST BP <80 MM HG: CPT | Performed by: INTERNAL MEDICINE

## 2025-04-10 NOTE — LETTER
April 10, 2025     Mason Aguiar DO  [address] [facility] [address]    Patient: Rishi Murphy   YOB: 1960   Date of Visit: 4/10/2025       Dear Dr. Mason Aguiar DO:    Thank you for referring Rishi Murphy to me for evaluation. Below are my notes for this consultation.  If you have questions, please do not hesitate to call me. I look forward to following your patient along with you.       Sincerely,     Mukesh Reed MD      CC: No Recipients  ______________________________________________________________________________________        CARDIOLOGY CONSULTATION NOTE       Patient:    Rishi Murphy    YOB: 1960  MRN:    [id number]    Date:   4/10/2025       REASON FOR CONSULT / CHIEF COMPLAINT:      Cardiology consultation to establish ongoing cardiac care and general care.     IMPRESSION:      Chronic fatigue  Shortness of breath  Chest pain  Dizziness  Snoring  Nocturnal diaphoresis  Lower extremity paresthesias  Paroxysmal atrial fibrillation maintained in sinus rhythm  Sick sinus syndrome status post permanent pacemaker, Medtronic SR XT DR MEDEL, implant 2018.  Nonsustained ventricular tachycardia history  High risk medications on sotalol, Xarelto  Long-term anticoagulation, Xarelto  Normal LV systolic function, LVEF 50 to 55%, echocardiogram 7/2023  Moderate concentric left ventricular hypertrophy  Coronary artery disease with mild by catheterization 6/2018  Hypertension  Hyperlipidemia  Diabetes  Obstructive sleep apnea, not on CPAP  Hypothyroidism  Anxiety  History of kidney stones  Past tobacco abuse  Family history of atrial fibrillation  Morbid obesity  Otherwise as per assessment below.    RECOMMENDATIONS:      Patient has above-noted history and findings.  He does have atrial fibrillation and peers to be maintaining sinus rhythm.  He does have a pacemaker.  It was checked recently and it appears to be functioning well.  He follows with Dr. Berger electrophysiology for  this.    Given his symptomatology especially his shortness of breath, snoring and chronic fatigue as well as previous history of obstructive sleep apnea.  Would suggest reapproaching sleep medicine with reinitiation of CPAP treatment.  He wishes to talk to his primary care physician before seeking sleep medicine care.    He otherwise is doing no well.  Would suggest continuing his current medications.  Refills were provided.    Exercise dietary program.    Hydration.    MyChart portal use was encouraged.    We will plan to see back in 3 months with echocardiogram, 2-day Lexiscan perfusion stress test, Laboratory Studies and ECG as noted.     Patient will follow up with their primary physician for general care.    The patient knows to contact medical care earlier if need be.      HPI:     Rishi Murphy was seen in cardiac evaluation at the [facility] Cardiology office April 10, 2025.      The patients problems are listed as in the impression above.    Electronic medical records reviewed.    Patient is a pleasant 64-year-old hypertensive, hyperlipidemic, diabetic morbidly obese gentleman with history of paroxysmal atrial fibrillation, sick sinus syndrome, nonsustained ventricular tachycardia post permanent pacemaker.  He is maintained on sotalol and Xarelto with underlying continue to pace to sinus rhythm.  Follows with Dr. Berger for his electrophysiology care.    He presents now to establish chronic ongoing cardiovascular care.    He states that he has symptoms of shortness of breath and chest pain with and without exertion.  He has nocturnal hot sweats and is woken multiple times at night.  He does have a history of obstructive sleep apnea but was unable to tolerate CPAP in the past.  He has lower extremity numbness as well.  Some dizziness at times.  He has had some nausea symptoms as well.    His last catheterization was in June 2018 which noted mild coronary artery disease medical treatment was advised.  Normal  left-ventricular function 50%.  Echocardiogram in 2023 noted preserved left ventricular function ejection fraction 50 to 55%.  Moderate concentric left hypertrophy.    His pacemaker was recently checked in March 2025 and appeared to be functioning well.    Patient denies TIA or CVA symptoms.  No CHF or Edema.  No GI,  or Bleeding Issues. No Recent Fever or Chills.     Cardiovascular and general review of systems is otherwise negative.    A 14-system review is otherwise negative, other than noted.    ALLERGIES:     No Known Allergies     MEDICATIONS:     Current Outpatient Medications   Medication Instructions   • acetaminophen (Tylenol Arthritis Pain) 650 mg ER tablet take 2 tablets as needed   • aspirin 81 mg, Daily   • atorvastatin (LIPITOR) 40 mg, Nightly   • digoxin (LANOXIN) 125 mcg, oral, Every other day   • famotidine (Pepcid) 20 mg tablet 1 tablet, 2 times daily   • hydroCHLOROthiazide (HYDRODIURIL) 25 mg, oral, Daily   • ibuprofen 600 mg tablet 1 tablet, 2 times daily (morning and late afternoon)   • levothyroxine (Synthroid, Levoxyl) 75 mcg tablet 1 tablet, Daily   • lisinopril 20 mg tablet 1 tablet, Daily   • metoprolol succinate XL (TOPROL-XL) 100 mg, oral, Daily   • sertraline (Zoloft) 100 mg tablet 1 tablet, Daily   • sotalol (BETAPACE) 120 mg, oral, 2 times daily   • Xarelto 20 mg, oral, Daily       PAST MEDICAL HISTORY:   As per impression above.  No other significant past medical or surgical history appreciated.    SOCIAL HISTORY:   .  Denies tobacco or alcohol use.  Past smoker.    FAMILY HISTORY:   Positive family history of atrial fibrillation.    VITALS:     Vitals:    04/10/25 1526   BP: 108/68   Pulse: 58       Wt Readings from Last 4 Encounters:   04/10/25 138 kg (304 lb)   12/18/24 142 kg (313 lb 8 oz)   06/19/24 141 kg (311 lb)   12/20/23 137 kg (302 lb 3.2 oz)       PHYSICAL EXAMINATION:      General: No acute distress.  Alert and oriented.  Head And Neck Examination: No jugular  venous distention, no carotid bruits, no mass. Carotid upstrokes preserved. Oral mucosa moist.  No xanthelasma. Head and neck examination otherwise unremarkable.  Lungs: Clear to auscultation and percussion. No wheezes, no rales,  and no rhonchi.  Chest: Excursion appeared to be normal. No chest wall tenderness on palpation.  Pacemaker left chest.  Heart: Normal S1 and S2. No S3. No S4. No rub. Grade 1/6 systolic murmur, best heard at the left sternal border. Point of maximal impulse was within normal limits.  Abdomen: Soft. Nontender. No organomegaly. No bruits. No masses.  Morbidly obese.  Extremities: No bipedal edema. No clubbing. No cyanosis.  Pulses are strong throughout. No bruits.  Musculoskeletal Exam: No ulcers, otherwise unremarkable.  Neuro: Neurologically appeared grossly intact.    ELECTROCARDIOGRAM:      None this visit, last ECG from December 2024 noted atrial paced rhythm rate 60.    CARDIAC TESTING:      None this visit    LABORATORY DATA:      None this visit              PROBLEM LIST:     Patient Active Problem List   Diagnosis   • Benign essential hypertension   • Hyperlipidemia   • Longstanding persistent atrial fibrillation (Multi)   • Morbidly obese (Multi)   • Near syncope   • Pacemaker   • Palpitations   • Sick sinus syndrome (Multi)   • SOB (shortness of breath) on exertion   • Anxiety   • Nephrolithiasis   • Osteoarthritis of ankle and foot   • Former smoker   • BMI 40.0-44.9, adult (Multi)   • Encounter to establish care   • Dizziness   • Chronic atrial fibrillation (Multi)   • NSVT (nonsustained ventricular tachycardia) (Multi)   • High risk medication use   • Obstructive sleep apnea   • Type 2 diabetes mellitus without complication, without long-term current use of insulin             Mukesh Reed MD, [facility] /  Cardiology      Of Note:  [employer] voice recognition dictation software was utilized partially in the preparation of this note, therefore, inaccuracies in  spelling, word choice and punctuation may have occurred which were not recognized at the time of signing.    Patient was seen and examined with total time of visit including chart preparation, rooming, and chart completion exceeding 40 minutes.      I,Malina SANCHEZ RN am scribing for, and in the presence of Dr. Mukesh Reed MD, FACC.    I, Dr. Mukesh Reed MD, FACC, personally performed the services described in the documentation as scribed by Malina SANCHEZ RN  in my presence, and confirm it is both accurate and complete.

## 2025-04-10 NOTE — PATIENT INSTRUCTIONS
Please bring all medicines, vitamins, and herbal supplements with you when you come to the office.    Prescriptions will not be filled unless you are compliant with your follow up appointments or have a follow up appointment scheduled as per instruction of your physician. Refills should be requested at the time of your visit.     BMI was above normal measurement. Current weight: 138 kg (304 lb)  Weight change since last visit (-) denotes wt loss -9.5 lbs   Weight loss needed to achieve BMI 25: 114.9 Lbs  Weight loss needed to achieve BMI 30: 77.1 Lbs  Provided instructions on dietary changes  Provided instructions on exercise.    Pacemaker/Defibrillator follow up per routine     Ask pcp about zepbound med     Get an echo    Get a stress test    Fasting labs

## 2025-04-10 NOTE — PROGRESS NOTES
CARDIOLOGY CONSULTATION NOTE       Patient:    Rishi Murphy    YOB: 1960  MRN:    [id number]    Date:   4/10/2025       REASON FOR CONSULT / CHIEF COMPLAINT:      Cardiology consultation to establish ongoing cardiac care and general care.     IMPRESSION:      Chronic fatigue  Shortness of breath  Chest pain  Dizziness  Snoring  Nocturnal diaphoresis  Lower extremity paresthesias  Paroxysmal atrial fibrillation maintained in sinus rhythm  Sick sinus syndrome status post permanent pacemaker, Medtronic SR XT DR MDEEL, implant 2018.  Nonsustained ventricular tachycardia history  High risk medications on sotalol, Xarelto  Long-term anticoagulation, Xarelto  Normal LV systolic function, LVEF 50 to 55%, echocardiogram 7/2023  Moderate concentric left ventricular hypertrophy  Coronary artery disease with mild by catheterization 6/2018  Hypertension  Hyperlipidemia  Diabetes  Obstructive sleep apnea, not on CPAP  Hypothyroidism  Anxiety  History of kidney stones  Past tobacco abuse  Family history of atrial fibrillation  Morbid obesity  Otherwise as per assessment below.    RECOMMENDATIONS:      Patient has above-noted history and findings.  He does have atrial fibrillation and peers to be maintaining sinus rhythm.  He does have a pacemaker.  It was checked recently and it appears to be functioning well.  He follows with Dr. Berger electrophysiology for this.    Given his symptomatology especially his shortness of breath, snoring and chronic fatigue as well as previous history of obstructive sleep apnea.  Would suggest reapproaching sleep medicine with reinitiation of CPAP treatment.  He wishes to talk to his primary care physician before seeking sleep medicine care.    He otherwise is doing no well.  Would suggest continuing his current medications.  Refills were provided.    Exercise dietary program.    Hydration.    [employer] portal use was encouraged.    We will plan to see back in 3 months with  echocardiogram, 2-day Lexiscan perfusion stress test, Laboratory Studies and ECG as noted.     Patient will follow up with their primary physician for general care.    The patient knows to contact medical care earlier if need be.      HPI:     Rishi Murphy was seen in cardiac evaluation at the [facility] Cardiology office April 10, 2025.      The patients problems are listed as in the impression above.    Electronic medical records reviewed.    Patient is a pleasant 64-year-old hypertensive, hyperlipidemic, diabetic morbidly obese gentleman with history of paroxysmal atrial fibrillation, sick sinus syndrome, nonsustained ventricular tachycardia post permanent pacemaker.  He is maintained on sotalol and Xarelto with underlying continue to pace to sinus rhythm.  Follows with Dr. Berger for his electrophysiology care.    He presents now to establish chronic ongoing cardiovascular care.    He states that he has symptoms of shortness of breath and chest pain with and without exertion.  He has nocturnal hot sweats and is woken multiple times at night.  He does have a history of obstructive sleep apnea but was unable to tolerate CPAP in the past.  He has lower extremity numbness as well.  Some dizziness at times.  He has had some nausea symptoms as well.    His last catheterization was in June 2018 which noted mild coronary artery disease medical treatment was advised.  Normal left-ventricular function 50%.  Echocardiogram in 2023 noted preserved left ventricular function ejection fraction 50 to 55%.  Moderate concentric left hypertrophy.    His pacemaker was recently checked in March 2025 and appeared to be functioning well.    Patient denies TIA or CVA symptoms.  No CHF or Edema.  No GI,  or Bleeding Issues. No Recent Fever or Chills.     Cardiovascular and general review of systems is otherwise negative.    A 14-system review is otherwise negative, other than noted.    ALLERGIES:     No Known Allergies     MEDICATIONS:      Current Outpatient Medications   Medication Instructions    acetaminophen (Tylenol Arthritis Pain) 650 mg ER tablet take 2 tablets as needed    aspirin 81 mg, Daily    atorvastatin (LIPITOR) 40 mg, Nightly    digoxin (LANOXIN) 125 mcg, oral, Every other day    famotidine (Pepcid) 20 mg tablet 1 tablet, 2 times daily    hydroCHLOROthiazide (HYDRODIURIL) 25 mg, oral, Daily    ibuprofen 600 mg tablet 1 tablet, 2 times daily (morning and late afternoon)    levothyroxine (Synthroid, Levoxyl) 75 mcg tablet 1 tablet, Daily    lisinopril 20 mg tablet 1 tablet, Daily    metoprolol succinate XL (TOPROL-XL) 100 mg, oral, Daily    sertraline (Zoloft) 100 mg tablet 1 tablet, Daily    sotalol (BETAPACE) 120 mg, oral, 2 times daily    Xarelto 20 mg, oral, Daily       PAST MEDICAL HISTORY:   As per impression above.  No other significant past medical or surgical history appreciated.    SOCIAL HISTORY:   .  Denies tobacco or alcohol use.  Past smoker.    FAMILY HISTORY:   Positive family history of atrial fibrillation.    VITALS:     Vitals:    04/10/25 1526   BP: 108/68   Pulse: 58       Wt Readings from Last 4 Encounters:   04/10/25 138 kg (304 lb)   12/18/24 142 kg (313 lb 8 oz)   06/19/24 141 kg (311 lb)   12/20/23 137 kg (302 lb 3.2 oz)       PHYSICAL EXAMINATION:      General: No acute distress.  Alert and oriented.  Head And Neck Examination: No jugular venous distention, no carotid bruits, no mass. Carotid upstrokes preserved. Oral mucosa moist.  No xanthelasma. Head and neck examination otherwise unremarkable.  Lungs: Clear to auscultation and percussion. No wheezes, no rales,  and no rhonchi.  Chest: Excursion appeared to be normal. No chest wall tenderness on palpation.  Pacemaker left chest.  Heart: Normal S1 and S2. No S3. No S4. No rub. Grade 1/6 systolic murmur, best heard at the left sternal border. Point of maximal impulse was within normal limits.  Abdomen: Soft. Nontender. No organomegaly. No bruits. No  masses.  Morbidly obese.  Extremities: No bipedal edema. No clubbing. No cyanosis.  Pulses are strong throughout. No bruits.  Musculoskeletal Exam: No ulcers, otherwise unremarkable.  Neuro: Neurologically appeared grossly intact.    ELECTROCARDIOGRAM:      None this visit, last ECG from December 2024 noted atrial paced rhythm rate 60.    CARDIAC TESTING:      None this visit    LABORATORY DATA:      None this visit              PROBLEM LIST:     Patient Active Problem List   Diagnosis    Benign essential hypertension    Hyperlipidemia    Longstanding persistent atrial fibrillation (Multi)    Morbidly obese (Multi)    Near syncope    Pacemaker    Palpitations    Sick sinus syndrome (Multi)    SOB (shortness of breath) on exertion    Anxiety    Nephrolithiasis    Osteoarthritis of ankle and foot    Former smoker    BMI 40.0-44.9, adult (Multi)    Encounter to establish care    Dizziness    Chronic atrial fibrillation (Multi)    NSVT (nonsustained ventricular tachycardia) (Multi)    High risk medication use    Obstructive sleep apnea    Type 2 diabetes mellitus without complication, without long-term current use of insulin             Mukesh Reed MD, FACC   [facility] /  Cardiology      Of Note:  [employer] voice recognition dictation software was utilized partially in the preparation of this note, therefore, inaccuracies in spelling, word choice and punctuation may have occurred which were not recognized at the time of signing.    Patient was seen and examined with total time of visit including chart preparation, rooming, and chart completion exceeding 40 minutes.      IMalina RN am scribing for, and in the presence of Dr. Mukesh Reed MD, FACC.    I, Dr. Mukesh Reed MD, [facility], personally performed the services described in the documentation as scribed by Malina SANCHEZ RN  in my presence, and confirm it is both accurate and complete.

## 2025-05-21 ENCOUNTER — TELEPHONE (OUTPATIENT)
Dept: CARDIOLOGY | Facility: CLINIC | Age: 65
End: 2025-05-21
Payer: MEDICARE

## 2025-05-21 NOTE — TELEPHONE ENCOUNTER
Left pt vm regarding moving appointment to MW schedule. Same day @ 10:30. Left call back number.

## 2025-06-05 LAB — NON-UH HIE MAGNESIUM: 1.8 MG/DL (ref 1.9–2.7)

## 2025-06-18 ENCOUNTER — HOSPITAL ENCOUNTER (OUTPATIENT)
Dept: RADIOLOGY | Facility: CLINIC | Age: 65
Discharge: HOME | End: 2025-06-18
Payer: MEDICARE

## 2025-06-18 ENCOUNTER — HOSPITAL ENCOUNTER (OUTPATIENT)
Dept: CARDIOLOGY | Facility: CLINIC | Age: 65
Discharge: HOME | End: 2025-06-18
Payer: MEDICARE

## 2025-06-18 VITALS — HEART RATE: 65 BPM | SYSTOLIC BLOOD PRESSURE: 124 MMHG | DIASTOLIC BLOOD PRESSURE: 84 MMHG

## 2025-06-18 VITALS — WEIGHT: 304 LBS | BODY MASS INDEX: 40.29 KG/M2 | HEIGHT: 73 IN

## 2025-06-18 DIAGNOSIS — E78.2 MIXED HYPERLIPIDEMIA: ICD-10-CM

## 2025-06-18 DIAGNOSIS — I47.29 NSVT (NONSUSTAINED VENTRICULAR TACHYCARDIA) (MULTI): ICD-10-CM

## 2025-06-18 DIAGNOSIS — R06.02 SOB (SHORTNESS OF BREATH) ON EXERTION: ICD-10-CM

## 2025-06-18 DIAGNOSIS — I49.5 SICK SINUS SYNDROME (MULTI): ICD-10-CM

## 2025-06-18 DIAGNOSIS — I48.20 CHRONIC ATRIAL FIBRILLATION (MULTI): ICD-10-CM

## 2025-06-18 DIAGNOSIS — R42 DIZZINESS: ICD-10-CM

## 2025-06-18 DIAGNOSIS — R07.9 CHEST PAIN, UNSPECIFIED TYPE: ICD-10-CM

## 2025-06-18 DIAGNOSIS — G47.33 OBSTRUCTIVE SLEEP APNEA: ICD-10-CM

## 2025-06-18 DIAGNOSIS — I10 BENIGN ESSENTIAL HYPERTENSION: ICD-10-CM

## 2025-06-18 DIAGNOSIS — Z79.899 HIGH RISK MEDICATION USE: ICD-10-CM

## 2025-06-18 LAB — BODY SURFACE AREA: 2.66 M2

## 2025-06-18 PROCEDURE — 78452 HT MUSCLE IMAGE SPECT MULT: CPT

## 2025-06-18 PROCEDURE — 3430000001 HC RX 343 DIAGNOSTIC RADIOPHARMACEUTICALS: Performed by: INTERNAL MEDICINE

## 2025-06-18 PROCEDURE — 2500000004 HC RX 250 GENERAL PHARMACY W/ HCPCS (ALT 636 FOR OP/ED): Performed by: INTERNAL MEDICINE

## 2025-06-18 PROCEDURE — 93017 CV STRESS TEST TRACING ONLY: CPT

## 2025-06-18 PROCEDURE — C8929 TTE W OR WO FOL WCON,DOPPLER: HCPCS

## 2025-06-18 PROCEDURE — A9502 TC99M TETROFOSMIN: HCPCS | Performed by: INTERNAL MEDICINE

## 2025-06-18 RX ORDER — REGADENOSON 0.08 MG/ML
0.4 INJECTION, SOLUTION INTRAVENOUS ONCE
Status: COMPLETED | OUTPATIENT
Start: 2025-06-18 | End: 2025-06-18

## 2025-06-18 RX ADMIN — TETROFOSMIN 35.7 MILLICURIE: 0.23 INJECTION, POWDER, LYOPHILIZED, FOR SOLUTION INTRAVENOUS at 10:09

## 2025-06-18 RX ADMIN — HUMAN ALBUMIN MICROSPHERES AND PERFLUTREN 0.5 ML: 10; .22 INJECTION, SOLUTION INTRAVENOUS at 11:53

## 2025-06-18 RX ADMIN — REGADENOSON 0.4 MG: 0.08 INJECTION, SOLUTION INTRAVENOUS at 10:08

## 2025-06-19 ENCOUNTER — HOSPITAL ENCOUNTER (OUTPATIENT)
Dept: RADIOLOGY | Facility: CLINIC | Age: 65
Discharge: HOME | End: 2025-06-19
Payer: MEDICARE

## 2025-06-19 PROCEDURE — 3430000001 HC RX 343 DIAGNOSTIC RADIOPHARMACEUTICALS: Performed by: INTERNAL MEDICINE

## 2025-06-19 PROCEDURE — A9502 TC99M TETROFOSMIN: HCPCS | Performed by: INTERNAL MEDICINE

## 2025-06-19 RX ADMIN — TETROFOSMIN 35.2 MILLICURIE: 0.23 INJECTION, POWDER, LYOPHILIZED, FOR SOLUTION INTRAVENOUS at 11:22

## 2025-06-25 ENCOUNTER — HOSPITAL ENCOUNTER (OUTPATIENT)
Dept: CARDIOLOGY | Facility: HOSPITAL | Age: 65
Discharge: HOME | End: 2025-06-25
Payer: MEDICARE

## 2025-06-25 ENCOUNTER — APPOINTMENT (OUTPATIENT)
Dept: CARDIOLOGY | Facility: CLINIC | Age: 65
End: 2025-06-25
Payer: MEDICARE

## 2025-06-25 VITALS
BODY MASS INDEX: 41.01 KG/M2 | DIASTOLIC BLOOD PRESSURE: 72 MMHG | HEART RATE: 64 BPM | HEIGHT: 73 IN | SYSTOLIC BLOOD PRESSURE: 112 MMHG | WEIGHT: 309.4 LBS

## 2025-06-25 DIAGNOSIS — R06.02 SOB (SHORTNESS OF BREATH) ON EXERTION: ICD-10-CM

## 2025-06-25 DIAGNOSIS — I48.11 LONGSTANDING PERSISTENT ATRIAL FIBRILLATION (MULTI): ICD-10-CM

## 2025-06-25 DIAGNOSIS — Z87.891 FORMER SMOKER: ICD-10-CM

## 2025-06-25 DIAGNOSIS — Z76.89 ENCOUNTER TO ESTABLISH CARE: ICD-10-CM

## 2025-06-25 DIAGNOSIS — Z95.0 PACEMAKER: ICD-10-CM

## 2025-06-25 DIAGNOSIS — I49.5 SICK SINUS SYNDROME (MULTI): ICD-10-CM

## 2025-06-25 DIAGNOSIS — Z79.899 HIGH RISK MEDICATION USE: ICD-10-CM

## 2025-06-25 DIAGNOSIS — I47.29 NSVT (NONSUSTAINED VENTRICULAR TACHYCARDIA) (MULTI): ICD-10-CM

## 2025-06-25 DIAGNOSIS — Z95.0 PACEMAKER: Primary | ICD-10-CM

## 2025-06-25 LAB
AORTIC VALVE MEAN GRADIENT: 3 MMHG
AORTIC VALVE PEAK VELOCITY: 1.17 M/S
AV PEAK GRADIENT: 5 MMHG
AVA (PEAK VEL): 3.78 CM2
AVA (VTI): 3.52 CM2
EJECTION FRACTION APICAL 4 CHAMBER: 68.1
EJECTION FRACTION: 65 %
LEFT ATRIUM VOLUME AREA LENGTH INDEX BSA: 27.6 ML/M2
LEFT VENTRICLE INTERNAL DIMENSION DIASTOLE: 5.63 CM (ref 3.5–6)
LEFT VENTRICULAR OUTFLOW TRACT DIAMETER: 2.39 CM
LV EJECTION FRACTION BIPLANE: 62 %
MITRAL VALVE E/A RATIO: 0.95
MITRAL VALVE E/E' RATIO: 7.22
RIGHT VENTRICLE FREE WALL PEAK S': 16.67 CM/S
RIGHT VENTRICLE PEAK SYSTOLIC PRESSURE: 18 MMHG

## 2025-06-25 PROCEDURE — 93280 PM DEVICE PROGR EVAL DUAL: CPT | Performed by: INTERNAL MEDICINE

## 2025-06-25 PROCEDURE — 1159F MED LIST DOCD IN RCRD: CPT | Performed by: INTERNAL MEDICINE

## 2025-06-25 PROCEDURE — 3074F SYST BP LT 130 MM HG: CPT | Performed by: INTERNAL MEDICINE

## 2025-06-25 PROCEDURE — 3008F BODY MASS INDEX DOCD: CPT | Performed by: INTERNAL MEDICINE

## 2025-06-25 PROCEDURE — 3078F DIAST BP <80 MM HG: CPT | Performed by: INTERNAL MEDICINE

## 2025-06-25 PROCEDURE — 4010F ACE/ARB THERAPY RXD/TAKEN: CPT | Performed by: INTERNAL MEDICINE

## 2025-06-25 PROCEDURE — 99215 OFFICE O/P EST HI 40 MIN: CPT | Performed by: INTERNAL MEDICINE

## 2025-06-25 PROCEDURE — 93280 PM DEVICE PROGR EVAL DUAL: CPT

## 2025-06-25 PROCEDURE — 1036F TOBACCO NON-USER: CPT | Performed by: INTERNAL MEDICINE

## 2025-06-25 PROCEDURE — 93000 ELECTROCARDIOGRAM COMPLETE: CPT | Mod: DISTINCT PROCEDURAL SERVICE | Performed by: INTERNAL MEDICINE

## 2025-06-25 ASSESSMENT — ENCOUNTER SYMPTOMS
PALPITATIONS: 0
DYSPNEA ON EXERTION: 1

## 2025-06-25 NOTE — PATIENT INSTRUCTIONS
Continue same medications/treatment.  Patient educated on proper medication use.  Patient educated on risk factor modification.  Please bring any lab results from other providers/physicians to your next appointment.    Please bring all medicines, vitamins, and herbal supplements with you when you come to the office.    Prescriptions will not be filled unless you are compliant with your follow up appointments or have a follow up appointment scheduled as per instruction of your physician. Refills should be requested at the time of your visit.    Follow up with our physician assistant, Leesa, in 6 months with device check   Continue remote checks at 3 and 9 months  We have messaged Dr. Huston's office requesting a heart cath.    Carmencita BRIGHT RN, AM SCRIBING FOR, AND IN THE PRESENCE OF DR. SOLITARIO BROWNING MD

## 2025-06-25 NOTE — H&P (VIEW-ONLY)
CARDIOLOGY OFFICE VISIT      CHIEF COMPLAINT  Chief Complaint   Patient presents with    Follow-up     6m with device check       HISTORY OF PRESENT ILLNESS  HPI  64-year-old  male who is followed for sinus node dysfunction status post dual-chamber pacemaker implant on October 19, 2018, paroxysmal atrial fibrillation controlled on a combination of sotalol, metoprolol, digoxin, and anticoagulated with Xarelto, hypothyroidism on replacement therapy and dyslipidemia on statin.      Stress test 06/2025  IMPRESSION:  Abnormal Lexiscan Myoview cardiac perfusion stress test.  Moderate sized inferior reversible perfusion defect consistent with  moderate inferior ischemia. No myocardial infarction by perfusion  imaging. Normal left ventricular systolic function, ejection fraction  61%. No comparison study available..    Patient states that he continues complaining of shortness of breath with moderate activities.  Occasional chest discomfort.  Pending results of the echocardiogram.  Stress test was completed and normal as described above.    EKG performed today shows atrial paced rhythm at rate of 64 bpm QRS ration 84 ms QT corrected 427 ms.  Rhythm strip shows the same pattern.    Patient had a device interrogation today at the device clinic.  He does have a dual-chamber pacemaker Medtronic with battery nearly 5 years 7 months.  No evidence of atrial fibrillation.  No supraventricular tachycardia 1 episode up to 2 seconds of duration rate up to 222 bpm.        Past Medical History  Medical History[1]    Social History  Social History[2]    Family History   Family History[3]     Allergies:  RX Allergies[4]     Outpatient Medications:  Current Outpatient Medications   Medication Instructions    acetaminophen (Tylenol Arthritis Pain) 650 mg ER tablet take 2 tablets as needed    aspirin 81 mg, Daily    atorvastatin (LIPITOR) 40 mg, Nightly    digoxin (LANOXIN) 125 mcg, oral, Every other day    famotidine (Pepcid) 20 mg  tablet 1 tablet, 2 times daily    hydroCHLOROthiazide (HYDRODIURIL) 25 mg, oral, Daily    ibuprofen 600 mg tablet 1 tablet, 2 times daily (morning and late afternoon)    levothyroxine (Synthroid, Levoxyl) 75 mcg tablet 1 tablet, Daily    lisinopril 20 mg tablet 1 tablet, Daily    metoprolol succinate XL (TOPROL-XL) 100 mg, oral, Daily    sertraline (Zoloft) 100 mg tablet 1.5 tablets, Daily    sotalol (BETAPACE) 120 mg, oral, 2 times daily    Xarelto 20 mg, oral, Daily          REVIEW OF SYSTEMS  Review of Systems   Cardiovascular:  Positive for chest pain and dyspnea on exertion. Negative for palpitations.   All other systems reviewed and are negative.          VITALS  Vitals:    06/25/25 1052   BP: 112/72   Pulse: 64       PHYSICAL EXAM  Constitutional:       General: Awake.      Appearance: Normal and healthy appearance. Well-developed and not in distress. Obese.   Neck:      Vascular: No JVR. JVD normal.   Pulmonary:      Effort: Pulmonary effort is normal.      Breath sounds: Normal breath sounds. No wheezing. No rhonchi. No rales.   Chest:      Chest wall: Not tender to palpatation.      Comments: Left sided device pocket- healed and well approximated. No swelling or hematoma      Cardiovascular:      PMI at left midclavicular line. Normal rate. Regular rhythm. Normal S1. Normal S2.       Murmurs: There is no murmur.      No gallop.  No click. No rub.   Pulses:     Intact distal pulses.   Edema:     Peripheral edema absent.   Abdominal:      Tenderness: There is no abdominal tenderness.   Musculoskeletal: Normal range of motion.         General: No tenderness. Skin:     General: Skin is warm and dry.   Neurological:      General: No focal deficit present.      Mental Status: Alert and oriented to person, place and time.           ASSESSMENT AND PLAN     Clinical impressions:  1. Sinus node dysfunction status post dual-chamber pacemaker implant ([employer] [place] XT DR MRI) on October 19, 2018.  2. Paroxysmal  atrial tachycardia controlled on sotalol and anticoagulated with Xarelto.  3. Nonsustained ventricular tachycardia on sotalol.  4. Hypothyroidism on replacement therapy.  5. Hypertension, controlled   6. Class II obesity with a BMI of 41.03.  7. Left heart catheterization dated June 22, 2018 revealing mild diffuse disease of the mid LAD and first diagonal with recommendation for medical management and ejection fraction of 50%.  8. 2D echocardiogram dated July 8, 2023 revealed an ejection fraction of 50 to 55%, moderate concentric LVH, mild left atrial enlargement, trace tricuspid regurgitation.      Plan recommendation    Patient is having significant amount of chest discomfort or shortness of breath.  Stress test is abnormal.  Will recommend a cardiac catheterization.  Procedure, risk, benefits and possible complications were explained to patient.  All questions were answered.  Patient agrees with plan.  Informed consent was signed.    Hold Xarelto 48 hours prior to procedure.    Follow my office every 6 months or sooner if needed.    Continue with sotalol therapy.    Risk factor modification and lifestyle modification discussed with patient. Diet , exercise and hydration discussed with patient.    I have personally review with patient during this office visit, laboratory data, echocardiogram results, stress test results, Holter-event monitor results prior and after the last electrophysiology visit. All questions has been answered.    Please excuse any errors in grammar or translation related to this dictation.  Voice recognition software was utilized to prepare this document.    I, Dr. Berger, personally performed the services described in the documentation as scribed by the nurse in my presence, and confirm it is both accurate and complete.            [1] History reviewed. No pertinent past medical history.  [2]   Social History  Tobacco Use    Smoking status: Former     Types: Cigarettes     Passive exposure:  Never    Smokeless tobacco: Never   Substance Use Topics    Alcohol use: Not Currently    Drug use: Never   [3]   Family History  Problem Relation Name Age of Onset    Atrial fibrillation Mother      Atrial fibrillation Brother     [4] No Known Allergies

## 2025-06-25 NOTE — PROGRESS NOTES
CARDIOLOGY OFFICE VISIT      CHIEF COMPLAINT  Chief Complaint   Patient presents with    Follow-up     6m with device check       HISTORY OF PRESENT ILLNESS  HPI  64-year-old  male who is followed for sinus node dysfunction status post dual-chamber pacemaker implant on October 19, 2018, paroxysmal atrial fibrillation controlled on a combination of sotalol, metoprolol, digoxin, and anticoagulated with Xarelto, hypothyroidism on replacement therapy and dyslipidemia on statin.      Stress test 06/2025  IMPRESSION:  Abnormal Lexiscan Myoview cardiac perfusion stress test.  Moderate sized inferior reversible perfusion defect consistent with  moderate inferior ischemia. No myocardial infarction by perfusion  imaging. Normal left ventricular systolic function, ejection fraction  61%. No comparison study available..    Patient states that he continues complaining of shortness of breath with moderate activities.  Occasional chest discomfort.  Pending results of the echocardiogram.  Stress test was completed and normal as described above.    EKG performed today shows atrial paced rhythm at rate of 64 bpm QRS ration 84 ms QT corrected 427 ms.  Rhythm strip shows the same pattern.    Patient had a device interrogation today at the device clinic.  He does have a dual-chamber pacemaker Medtronic with battery nearly 5 years 7 months.  No evidence of atrial fibrillation.  No supraventricular tachycardia 1 episode up to 2 seconds of duration rate up to 222 bpm.        Past Medical History  Medical History[1]    Social History  Social History[2]    Family History   Family History[3]     Allergies:  RX Allergies[4]     Outpatient Medications:  Current Outpatient Medications   Medication Instructions    acetaminophen (Tylenol Arthritis Pain) 650 mg ER tablet take 2 tablets as needed    aspirin 81 mg, Daily    atorvastatin (LIPITOR) 40 mg, Nightly    digoxin (LANOXIN) 125 mcg, oral, Every other day    famotidine (Pepcid) 20 mg  tablet 1 tablet, 2 times daily    hydroCHLOROthiazide (HYDRODIURIL) 25 mg, oral, Daily    ibuprofen 600 mg tablet 1 tablet, 2 times daily (morning and late afternoon)    levothyroxine (Synthroid, Levoxyl) 75 mcg tablet 1 tablet, Daily    lisinopril 20 mg tablet 1 tablet, Daily    metoprolol succinate XL (TOPROL-XL) 100 mg, oral, Daily    sertraline (Zoloft) 100 mg tablet 1.5 tablets, Daily    sotalol (BETAPACE) 120 mg, oral, 2 times daily    Xarelto 20 mg, oral, Daily          REVIEW OF SYSTEMS  Review of Systems   Cardiovascular:  Positive for chest pain and dyspnea on exertion. Negative for palpitations.   All other systems reviewed and are negative.          VITALS  Vitals:    06/25/25 1052   BP: 112/72   Pulse: 64       PHYSICAL EXAM  Constitutional:       General: Awake.      Appearance: Normal and healthy appearance. Well-developed and not in distress. Obese.   Neck:      Vascular: No JVR. JVD normal.   Pulmonary:      Effort: Pulmonary effort is normal.      Breath sounds: Normal breath sounds. No wheezing. No rhonchi. No rales.   Chest:      Chest wall: Not tender to palpatation.      Comments: Left sided device pocket- healed and well approximated. No swelling or hematoma      Cardiovascular:      PMI at left midclavicular line. Normal rate. Regular rhythm. Normal S1. Normal S2.       Murmurs: There is no murmur.      No gallop.  No click. No rub.   Pulses:     Intact distal pulses.   Edema:     Peripheral edema absent.   Abdominal:      Tenderness: There is no abdominal tenderness.   Musculoskeletal: Normal range of motion.         General: No tenderness. Skin:     General: Skin is warm and dry.   Neurological:      General: No focal deficit present.      Mental Status: Alert and oriented to person, place and time.           ASSESSMENT AND PLAN     Clinical impressions:  1. Sinus node dysfunction status post dual-chamber pacemaker implant ([employer] [place] XT DR MRI) on October 19, 2018.  2. Paroxysmal  atrial tachycardia controlled on sotalol and anticoagulated with Xarelto.  3. Nonsustained ventricular tachycardia on sotalol.  4. Hypothyroidism on replacement therapy.  5. Hypertension, controlled   6. Class II obesity with a BMI of 41.03.  7. Left heart catheterization dated June 22, 2018 revealing mild diffuse disease of the mid LAD and first diagonal with recommendation for medical management and ejection fraction of 50%.  8. 2D echocardiogram dated July 8, 2023 revealed an ejection fraction of 50 to 55%, moderate concentric LVH, mild left atrial enlargement, trace tricuspid regurgitation.      Plan recommendation    Patient is having significant amount of chest discomfort or shortness of breath.  Stress test is abnormal.  Will recommend a cardiac catheterization.  Procedure, risk, benefits and possible complications were explained to patient.  All questions were answered.  Patient agrees with plan.  Informed consent was signed.    Hold Xarelto 48 hours prior to procedure.    Follow my office every 6 months or sooner if needed.    Continue with sotalol therapy.    Risk factor modification and lifestyle modification discussed with patient. Diet , exercise and hydration discussed with patient.    I have personally review with patient during this office visit, laboratory data, echocardiogram results, stress test results, Holter-event monitor results prior and after the last electrophysiology visit. All questions has been answered.    Please excuse any errors in grammar or translation related to this dictation.  Voice recognition software was utilized to prepare this document.    I, Dr. Berger, personally performed the services described in the documentation as scribed by the nurse in my presence, and confirm it is both accurate and complete.            [1] History reviewed. No pertinent past medical history.  [2]   Social History  Tobacco Use    Smoking status: Former     Types: Cigarettes     Passive exposure:  Never    Smokeless tobacco: Never   Substance Use Topics    Alcohol use: Not Currently    Drug use: Never   [3]   Family History  Problem Relation Name Age of Onset    Atrial fibrillation Mother      Atrial fibrillation Brother     [4] No Known Allergies

## 2025-06-27 ENCOUNTER — TELEPHONE (OUTPATIENT)
Dept: CARDIOLOGY | Facility: CLINIC | Age: 65
End: 2025-06-27
Payer: MEDICARE

## 2025-06-27 DIAGNOSIS — R94.39 ABNORMAL STRESS TEST: Primary | ICD-10-CM

## 2025-06-27 DIAGNOSIS — R07.89 OTHER CHEST PAIN: ICD-10-CM

## 2025-06-27 DIAGNOSIS — R06.02 SHORTNESS OF BREATH: ICD-10-CM

## 2025-06-27 NOTE — TELEPHONE ENCOUNTER
Pre-Procedure Patient Information    You have been scheduled for: left heart cath  At: [facility]  With: Dr. Stephens   Date of procedure:     1. Please have transportation to and from the hospital. While you should plan for same-day discharge there is a possibility you will need to stay overnight.    2. You will receive a call from the hospital 24 - 72 hours before your procedure providing you with fasting instructions, procedure location detail, and time of arrival.  If you have not received a call from the hospital by 6 pm the day before your scheduled procedure, please call [phone].    3. Please bring a current list of medications with you to the hospital.      4. Medications to hold:     - If you are on a blood thinner (like Eliquis and Xarelto), please hold for 2 full days before procedure.             - Otherwise, you may continue your medications in the morning with sips of water.     5. Nothing to eat after midnight before the procedure. OK to take morning medications, with above exceptions, the day of the procedure with a small sip of water.     6. Please bring to our attention if you have any contrast, latex or metal allergies.    7. Please have your blood work as instructed completed at least a day before your procedure.    8. If you have any questions, please contact the office at [phone].

## 2025-06-30 PROBLEM — R06.02 SHORTNESS OF BREATH: Status: ACTIVE | Noted: 2025-06-27

## 2025-06-30 PROBLEM — R94.39 ABNORMAL STRESS TEST: Status: ACTIVE | Noted: 2025-06-27

## 2025-07-10 ENCOUNTER — HOSPITAL ENCOUNTER (OUTPATIENT)
Facility: HOSPITAL | Age: 65
Setting detail: OUTPATIENT SURGERY
Discharge: HOME | End: 2025-07-10
Attending: INTERNAL MEDICINE | Admitting: INTERNAL MEDICINE
Payer: MEDICARE

## 2025-07-10 ENCOUNTER — APPOINTMENT (OUTPATIENT)
Dept: CARDIOLOGY | Facility: HOSPITAL | Age: 65
End: 2025-07-10
Payer: MEDICARE

## 2025-07-10 DIAGNOSIS — I48.11 LONGSTANDING PERSISTENT ATRIAL FIBRILLATION (MULTI): ICD-10-CM

## 2025-07-10 DIAGNOSIS — R07.89 OTHER CHEST PAIN: ICD-10-CM

## 2025-07-10 DIAGNOSIS — R06.02 SHORTNESS OF BREATH: ICD-10-CM

## 2025-07-10 DIAGNOSIS — R94.39 ABNORMAL STRESS TEST: Primary | ICD-10-CM

## 2025-07-10 DIAGNOSIS — I20.9 ANGINA PECTORIS, UNSPECIFIED: ICD-10-CM

## 2025-07-10 LAB
ANION GAP SERPL CALC-SCNC: 14 MMOL/L (ref 10–20)
APTT PPP: 31 SECONDS (ref 26–36)
ATRIAL RATE: 62 BPM
BUN SERPL-MCNC: 22 MG/DL (ref 6–23)
CALCIUM SERPL-MCNC: 9.3 MG/DL (ref 8.6–10.3)
CHLORIDE SERPL-SCNC: 106 MMOL/L (ref 98–107)
CO2 SERPL-SCNC: 25 MMOL/L (ref 21–32)
CREAT SERPL-MCNC: 1.38 MG/DL (ref 0.5–1.3)
EGFRCR SERPLBLD CKD-EPI 2021: 56 ML/MIN/1.73M*2
ERYTHROCYTE [DISTWIDTH] IN BLOOD BY AUTOMATED COUNT: 12.7 % (ref 11.5–14.5)
GLUCOSE SERPL-MCNC: 119 MG/DL (ref 74–99)
HCT VFR BLD AUTO: 45.4 % (ref 41–52)
HGB BLD-MCNC: 15.4 G/DL (ref 13.5–17.5)
INR PPP: 1 (ref 0.9–1.1)
MCH RBC QN AUTO: 31.2 PG (ref 26–34)
MCHC RBC AUTO-ENTMCNC: 33.9 G/DL (ref 32–36)
MCV RBC AUTO: 92 FL (ref 80–100)
NRBC BLD-RTO: 0 /100 WBCS (ref 0–0)
PLATELET # BLD AUTO: 262 X10*3/UL (ref 150–450)
POTASSIUM SERPL-SCNC: 4.2 MMOL/L (ref 3.5–5.3)
PR INTERVAL: 218 MS
PROTHROMBIN TIME: 11 SECONDS (ref 9.8–12.4)
Q ONSET: 212 MS
QRS COUNT: 10 BEATS
QRS DURATION: 88 MS
QT INTERVAL: 436 MS
QTC CALCULATION(BAZETT): 442 MS
QTC FREDERICIA: 441 MS
R AXIS: -18 DEGREES
RBC # BLD AUTO: 4.94 X10*6/UL (ref 4.5–5.9)
SODIUM SERPL-SCNC: 141 MMOL/L (ref 136–145)
T AXIS: 37 DEGREES
T OFFSET: 430 MS
VENTRICULAR RATE: 62 BPM
WBC # BLD AUTO: 9.3 X10*3/UL (ref 4.4–11.3)

## 2025-07-10 PROCEDURE — 36415 COLL VENOUS BLD VENIPUNCTURE: CPT | Performed by: NURSE PRACTITIONER

## 2025-07-10 PROCEDURE — 2500000001 HC RX 250 WO HCPCS SELF ADMINISTERED DRUGS (ALT 637 FOR MEDICARE OP): Performed by: NURSE PRACTITIONER

## 2025-07-10 PROCEDURE — 93458 L HRT ARTERY/VENTRICLE ANGIO: CPT | Performed by: INTERNAL MEDICINE

## 2025-07-10 PROCEDURE — 80048 BASIC METABOLIC PNL TOTAL CA: CPT | Performed by: NURSE PRACTITIONER

## 2025-07-10 PROCEDURE — 7100000009 HC PHASE TWO TIME - INITIAL BASE CHARGE: Performed by: INTERNAL MEDICINE

## 2025-07-10 PROCEDURE — C1887 CATHETER, GUIDING: HCPCS | Performed by: INTERNAL MEDICINE

## 2025-07-10 PROCEDURE — 93005 ELECTROCARDIOGRAM TRACING: CPT | Mod: 59

## 2025-07-10 PROCEDURE — 2550000001 HC RX 255 CONTRASTS: Mod: JW | Performed by: INTERNAL MEDICINE

## 2025-07-10 PROCEDURE — 7100000001 HC RECOVERY ROOM TIME - INITIAL BASE CHARGE: Performed by: INTERNAL MEDICINE

## 2025-07-10 PROCEDURE — 7100000002 HC RECOVERY ROOM TIME - EACH INCREMENTAL 1 MINUTE: Performed by: INTERNAL MEDICINE

## 2025-07-10 PROCEDURE — 2720000007 HC OR 272 NO HCPCS: Performed by: INTERNAL MEDICINE

## 2025-07-10 PROCEDURE — 93010 ELECTROCARDIOGRAM REPORT: CPT | Performed by: INTERNAL MEDICINE

## 2025-07-10 PROCEDURE — C1894 INTRO/SHEATH, NON-LASER: HCPCS | Performed by: INTERNAL MEDICINE

## 2025-07-10 PROCEDURE — 7100000010 HC PHASE TWO TIME - EACH INCREMENTAL 1 MINUTE: Performed by: INTERNAL MEDICINE

## 2025-07-10 PROCEDURE — 85027 COMPLETE CBC AUTOMATED: CPT | Performed by: NURSE PRACTITIONER

## 2025-07-10 PROCEDURE — 2500000004 HC RX 250 GENERAL PHARMACY W/ HCPCS (ALT 636 FOR OP/ED): Performed by: INTERNAL MEDICINE

## 2025-07-10 PROCEDURE — 99152 MOD SED SAME PHYS/QHP 5/>YRS: CPT | Performed by: INTERNAL MEDICINE

## 2025-07-10 PROCEDURE — C1760 CLOSURE DEV, VASC: HCPCS | Performed by: INTERNAL MEDICINE

## 2025-07-10 PROCEDURE — 99024 POSTOP FOLLOW-UP VISIT: CPT | Performed by: NURSE PRACTITIONER

## 2025-07-10 PROCEDURE — 85610 PROTHROMBIN TIME: CPT | Performed by: NURSE PRACTITIONER

## 2025-07-10 RX ORDER — NITROGLYCERIN 5 MG/ML
INJECTION, SOLUTION INTRAVENOUS AS NEEDED
Status: DISCONTINUED | OUTPATIENT
Start: 2025-07-10 | End: 2025-07-10 | Stop reason: HOSPADM

## 2025-07-10 RX ORDER — LIDOCAINE HYDROCHLORIDE 20 MG/ML
INJECTION, SOLUTION INFILTRATION; PERINEURAL AS NEEDED
Status: DISCONTINUED | OUTPATIENT
Start: 2025-07-10 | End: 2025-07-10 | Stop reason: HOSPADM

## 2025-07-10 RX ORDER — MIDAZOLAM HYDROCHLORIDE 1 MG/ML
INJECTION, SOLUTION INTRAMUSCULAR; INTRAVENOUS AS NEEDED
Status: DISCONTINUED | OUTPATIENT
Start: 2025-07-10 | End: 2025-07-10 | Stop reason: HOSPADM

## 2025-07-10 RX ORDER — RANOLAZINE 500 MG/1
500 TABLET, EXTENDED RELEASE ORAL ONCE
Status: COMPLETED | OUTPATIENT
Start: 2025-07-10 | End: 2025-07-10

## 2025-07-10 RX ORDER — RIVAROXABAN 20 MG/1
20 TABLET, FILM COATED ORAL DAILY
Start: 2025-07-10

## 2025-07-10 RX ORDER — FENTANYL CITRATE 50 UG/ML
INJECTION, SOLUTION INTRAMUSCULAR; INTRAVENOUS AS NEEDED
Status: DISCONTINUED | OUTPATIENT
Start: 2025-07-10 | End: 2025-07-10 | Stop reason: HOSPADM

## 2025-07-10 RX ORDER — ASPIRIN 325 MG
325 TABLET ORAL ONCE
Status: COMPLETED | OUTPATIENT
Start: 2025-07-10 | End: 2025-07-10

## 2025-07-10 RX ORDER — HEPARIN SODIUM 1000 [USP'U]/ML
INJECTION, SOLUTION INTRAVENOUS; SUBCUTANEOUS AS NEEDED
Status: DISCONTINUED | OUTPATIENT
Start: 2025-07-10 | End: 2025-07-10 | Stop reason: HOSPADM

## 2025-07-10 RX ADMIN — RANOLAZINE 500 MG: 500 TABLET, FILM COATED, EXTENDED RELEASE ORAL at 12:45

## 2025-07-10 RX ADMIN — ASPIRIN 325 MG: 325 TABLET ORAL at 12:45

## 2025-07-10 ASSESSMENT — PAIN SCALES - GENERAL

## 2025-07-10 ASSESSMENT — PAIN - FUNCTIONAL ASSESSMENT: PAIN_FUNCTIONAL_ASSESSMENT: 0-10

## 2025-07-10 ASSESSMENT — COLUMBIA-SUICIDE SEVERITY RATING SCALE - C-SSRS
2. HAVE YOU ACTUALLY HAD ANY THOUGHTS OF KILLING YOURSELF?: NO
6. HAVE YOU EVER DONE ANYTHING, STARTED TO DO ANYTHING, OR PREPARED TO DO ANYTHING TO END YOUR LIFE?: NO
1. IN THE PAST MONTH, HAVE YOU WISHED YOU WERE DEAD OR WISHED YOU COULD GO TO SLEEP AND NOT WAKE UP?: NO

## 2025-07-10 NOTE — DISCHARGE INSTRUCTIONS
Restart Xarelto on 7/11/2025            CARDIAC CATHETERIZATION DISCHARGE INSTRUCTIONS     FOR SUDDEN AND SEVERE CHEST PAIN, SHORTNESS OF BREATH, EXCESSIVE BLEEDING, SIGNS OF STROKE, OR CHANGES IN MENTAL STATUS YOU SHOULD CALL 911 IMMEDIATELY.       FOR NEXT 24 HOURS  - Upon discharge, you should return home and rest for the remainder of the day and evening. You do not have to stay on bed rest but should not be very active.  It is recommended a responsible adult be with you for the first 24 hours after the procedure.    - No driving for 24 hours after procedure. Please arrange for someone to drive you home from the hospital today.     - Do not drive, operate machinery, or use power tools for 24 hours after your procedure.     - Do not make any legal decisions for 24 hours after your procedure.     - Do not drink alcoholic beverages for 24 hours after your procedure.    WOUND CARE     *FOR RADIAL (WRIST) ACCESS*  ·      No lifting more than 5 pounds or excessive use of the wrist for 3 days - for example, treat your wrist as if it is sprained.  ·      Do not engage in vigorous activities (tennis, golf, bowling, weights) for at least 3 days after the procedure.  ·      Do not submerge the wrist for 7 days after the procedure.  ·      You should expect mild tingling in your hand and tenderness at the puncture site for up to 3 days.    - The transparent dressing should be removed from the site 24 hours after the procedure.  Wash the site gently with soap and water. Rinse well and pat dry. Keep the area clean and dry. You may apply a Band-Aid to the site. Avoid lotions, ointments, or powders until fully healed.     - You may shower the day after your procedure.      - It is normal to notice a small bruise around the puncture site and/or a small grape sized or smaller lump. Any large bruising or large lump warrants a call to the office.     - If bleeding should occur, apply pressure to the affected area for 10 minutes.  If  the bleeding stops notify your physician.  If there is a large amount of bleeding or spurting of blood CALL 911 immediately.  DO NOT drive yourself to the hospital.    - You may experience some tenderness, bruising or minimal inflammation.  If you have any concerns, you may contact the Cath Lab or if any of these symptoms become excessive, contact your cardiologist or go to the emergency room.     OTHER INSTRUCTIONS  - You may take acetaminophen (Tylenol) as directed for discomfort.  If pain is not relieved with acetaminophen (Tylenol), contact your doctor.    - If you notice or experience any of the following, you should notify your doctor or seek medical attention  Chest pain or discomfort  Change in mental status or weakness in extremities.  Dizziness, light headedness, or feeling faint.  Change in the site where the procedure was performed, such as bleeding or an increased area of bruising or swelling.  Tingling, numbness, pain, or coolness in the leg/arm beyond the site where the procedure was performed.  Signs of infection (i.e. shaking chills, temperature > 100 degrees Fahrenheit, warmth, redness) in the leg/arm area where the procedure was performed.  Changes in urination   Bloody or black stools  Vomiting blood  Severe nose bleeds  Any excessive bleeding    - If you DO NOT have an appointment with your cardiologist within 2-4 weeks following your procedure, please contact their office.

## 2025-07-10 NOTE — NURSING NOTE
Reviewed AVS discharge paperwork with patient and wife. All questions answered. Both verbalize understanding of activity restrictions, right radial care, medications and follow-up appointments.

## 2025-07-10 NOTE — POST-PROCEDURE NOTE
Physician Transition of Care Summary  Invasive Cardiovascular Lab    Procedure Date: 7/10/2025  Attending:    * Sheyla Stephens - Primary  Resident/Fellow/Other Assistant: Surgeons and Role:  * No surgeons found with a matching role *    Indications:   Pre-op Diagnosis      * Abnormal stress test [R94.39]     * Shortness of breath [R06.02]     * Other chest pain [R07.89]    Post-procedure diagnosis:   Post-op Diagnosis     * Abnormal stress test [R94.39]     * Shortness of breath [R06.02]     * Other chest pain [R07.89]    Procedure(s):   [facility], With [place]  [id number] - SC CATH PLMT L HRT & ARTS W/NJX & ANGIO IMG S&I        Procedure Findings:   Normal coronaries, normal left ventricular function    Description of the Procedure:   Left heart catheterization coronary angiography left ventriculogram    Complications:   None    Stents/Implants:   Implants       No implant documentation for this case.            Anticoagulation/Antiplatelet Plan:   Intravenous heparin    Estimated Blood Loss:   * No values recorded between 7/10/2025  1:16 PM and 7/10/2025  1:58 PM *    Anesthesia: Moderate Sedation Anesthesia Staff: No anesthesia staff entered.    Any Specimen(s) Removed:   Order Name Source Comment Collection Info Order Time   COAGULATION SCREEN Blood, Venous  Collected By: Bobbi Hebert RN 7/10/2025 12:15 PM     Release result to MyChart   Immediate        BASIC METABOLIC PANEL Blood, Venous  Collected By: Bobbi Hebert RN 7/10/2025 12:15 PM     Release result to MyChart   Immediate        CBC Blood, Venous  Collected By: Bobbi Hebert RN 7/10/2025 12:15 PM     Release result to MyChart   Immediate            Disposition:   Medical therapy      Electronically signed by: Sheyla Stephens MD, 7/10/2025 1:58 PM

## 2025-07-11 VITALS
TEMPERATURE: 96.8 F | HEIGHT: 73 IN | OXYGEN SATURATION: 98 % | BODY MASS INDEX: 40.15 KG/M2 | DIASTOLIC BLOOD PRESSURE: 94 MMHG | RESPIRATION RATE: 11 BRPM | HEART RATE: 60 BPM | WEIGHT: 302.91 LBS | SYSTOLIC BLOOD PRESSURE: 160 MMHG

## 2025-07-14 LAB
ATRIAL RATE: 62 BPM
PR INTERVAL: 218 MS
Q ONSET: 212 MS
QRS COUNT: 10 BEATS
QRS DURATION: 88 MS
QT INTERVAL: 436 MS
QTC CALCULATION(BAZETT): 442 MS
QTC FREDERICIA: 441 MS
R AXIS: -18 DEGREES
T AXIS: 37 DEGREES
T OFFSET: 430 MS
VENTRICULAR RATE: 62 BPM

## 2025-07-17 ENCOUNTER — APPOINTMENT (OUTPATIENT)
Dept: CARDIOLOGY | Facility: CLINIC | Age: 65
End: 2025-07-17
Payer: MEDICARE

## 2025-07-17 VITALS
SYSTOLIC BLOOD PRESSURE: 130 MMHG | DIASTOLIC BLOOD PRESSURE: 88 MMHG | WEIGHT: 300.4 LBS | HEIGHT: 73 IN | BODY MASS INDEX: 39.81 KG/M2 | HEART RATE: 71 BPM

## 2025-07-17 DIAGNOSIS — I49.5 SICK SINUS SYNDROME (MULTI): ICD-10-CM

## 2025-07-17 DIAGNOSIS — Z95.0 PACEMAKER: ICD-10-CM

## 2025-07-17 DIAGNOSIS — I25.10 CORONARY ARTERY DISEASE INVOLVING NATIVE CORONARY ARTERY OF NATIVE HEART WITHOUT ANGINA PECTORIS: ICD-10-CM

## 2025-07-17 DIAGNOSIS — R06.02 SOB (SHORTNESS OF BREATH) ON EXERTION: ICD-10-CM

## 2025-07-17 DIAGNOSIS — I48.11 LONGSTANDING PERSISTENT ATRIAL FIBRILLATION (MULTI): ICD-10-CM

## 2025-07-17 DIAGNOSIS — Z87.891 FORMER SMOKER: ICD-10-CM

## 2025-07-17 DIAGNOSIS — E66.01 MORBIDLY OBESE (MULTI): Primary | ICD-10-CM

## 2025-07-17 DIAGNOSIS — E11.9 TYPE 2 DIABETES MELLITUS WITHOUT COMPLICATION, WITHOUT LONG-TERM CURRENT USE OF INSULIN: ICD-10-CM

## 2025-07-17 DIAGNOSIS — Z79.899 HIGH RISK MEDICATION USE: ICD-10-CM

## 2025-07-17 DIAGNOSIS — I48.20 CHRONIC ATRIAL FIBRILLATION (MULTI): ICD-10-CM

## 2025-07-17 DIAGNOSIS — I47.29 NSVT (NONSUSTAINED VENTRICULAR TACHYCARDIA) (MULTI): ICD-10-CM

## 2025-07-17 DIAGNOSIS — R94.39 ABNORMAL STRESS TEST: ICD-10-CM

## 2025-07-17 DIAGNOSIS — E78.2 MIXED HYPERLIPIDEMIA: ICD-10-CM

## 2025-07-17 DIAGNOSIS — I10 BENIGN ESSENTIAL HYPERTENSION: ICD-10-CM

## 2025-07-17 DIAGNOSIS — G47.33 OBSTRUCTIVE SLEEP APNEA: ICD-10-CM

## 2025-07-17 PROCEDURE — 93000 ELECTROCARDIOGRAM COMPLETE: CPT | Performed by: INTERNAL MEDICINE

## 2025-07-17 PROCEDURE — 3075F SYST BP GE 130 - 139MM HG: CPT | Performed by: INTERNAL MEDICINE

## 2025-07-17 PROCEDURE — 4010F ACE/ARB THERAPY RXD/TAKEN: CPT | Performed by: INTERNAL MEDICINE

## 2025-07-17 PROCEDURE — 99214 OFFICE O/P EST MOD 30 MIN: CPT | Performed by: INTERNAL MEDICINE

## 2025-07-17 PROCEDURE — 1159F MED LIST DOCD IN RCRD: CPT | Performed by: INTERNAL MEDICINE

## 2025-07-17 PROCEDURE — 3008F BODY MASS INDEX DOCD: CPT | Performed by: INTERNAL MEDICINE

## 2025-07-17 PROCEDURE — 3079F DIAST BP 80-89 MM HG: CPT | Performed by: INTERNAL MEDICINE

## 2025-07-17 RX ORDER — ATORVASTATIN CALCIUM 80 MG/1
80 TABLET, FILM COATED ORAL NIGHTLY
Qty: 90 TABLET | Refills: 3 | Status: SHIPPED | OUTPATIENT
Start: 2025-07-17 | End: 2026-07-17

## 2025-07-17 NOTE — LETTER
July 17, 2025     Mason Aguiar DO  [address] [facility] [address]    Patient: Rishi Murphy   YOB: 1960   Date of Visit: 7/17/2025       Dear Dr. Mason Aguiar DO:    Thank you for referring Rishi Murphy to me for evaluation. Below are my notes for this consultation.  If you have questions, please do not hesitate to call me. I look forward to following your patient along with you.       Sincerely,     Mukesh Reed MD      CC: No Recipients  ______________________________________________________________________________________      CARDIOLOGY OFFICE NOTE     Date:   7/17/2025    Patient:    Rishi Murphy    YOB: 1960    Primary Physician: Mason Aguiar DO         REASON FOR VISIT / CHIEF COMPLAINT:     Cardiology follow-up post testing    HPI:     Rishi Murphy was seen in cardiac evaluation at the [facility] Cardiology office July 17, 2025.      The patients problems are listed as in the impression below.    Electronic medical records reviewed.    Patient returns.  He has shortness of breath, chest pain and fatigue symptoms.  His chest pain symptoms have resolved currently on treatment.  He still has significant dyspnea on exertion.    He had abnormal stress test and therefore underwent cardiac catheterization which fortunately noted normal coronary arteries.  LVEF was 55%.    Echocardiogram was normal.    Patient denies Chest Pain, Lightheadedness, Dizziness, TIA or CVA symptoms.  No CHF or Edema.  No Palpitations.  No GI,  or Bleeding Issues. No Recent Fever or Chills.     He does have obstructive sleep apnea for which he is not intolerant of CPAP.  His primary care physician helps him with this.    Cardiovascular and general review of systems is otherwise negative.    A 14-system review is otherwise negative, other than noted.     PHYSICAL EXAMINATION:      Vitals:    07/17/25 0848   BP: 130/88   Pulse: 71     General: No acute distress.  Alert and oriented.  Head And  Neck Examination: No jugular venous distention, no carotid bruits, no mass. Carotid upstrokes preserved. Oral mucosa moist.  No xanthelasma. Head and neck examination otherwise unremarkable.  Lungs: Clear to auscultation and percussion. No wheezes, no rales,  and no rhonchi.  Chest: Excursion appeared to be normal. No chest wall tenderness on palpation.  Pacemaker left chest.  Heart: Normal S1 and S2. No S3. No S4. No rub. Grade 1/6 systolic murmur, best heard at the left sternal border. Point of maximal impulse was within normal limits.  Abdomen: Soft. Nontender. No organomegaly. No bruits. No masses.  Morbidly obese.  Extremities: No bipedal edema. No clubbing. No cyanosis.  Pulses are strong throughout. No bruits.  Musculoskeletal Exam: No ulcers, otherwise unremarkable.  Neuro: Neurologically appeared grossly intact.  .  IMPRESSION:        Cardiovascular status stable  Chronic fatigue  Shortness of breath  Chest pain, resolved the  Dizziness  Snoring  Nocturnal diaphoresis  Lower extremity paresthesias  Paroxysmal atrial fibrillation maintained in sinus rhythm  Sick sinus syndrome status post permanent pacemaker, Medtronic SR XT  MRI, implant 2018.  Nonsustained ventricular tachycardia history  High risk medications on sotalol, Xarelto  Long-term anticoagulation, Xarelto  Normal LV systolic function, LVEF 65%, echocardiogram 6/2025.  Moderate concentric left ventricular hypertrophy  Coronary artery disease with mild by catheterization 6/2018 and 7/2025.  False positive Lexiscan perfusion stress test, 6/2025.  Hypertension  Hyperlipidemia  Diabetes  Obstructive sleep apnea, not on CPAP  Hypothyroidism  Anxiety  History of kidney stones  Past tobacco abuse  Family history of atrial fibrillation  Morbid obesity  Otherwise as per assessment below.    RECOMMENDATIONS:      Patient overall is doing well.  He was reassured of the above findings.  He has still persistent shortness of breath fatigue and nocturnal  diaphoresis with snoring.  This is most likely due to his obstructive sleep apnea and morbid obesity.  He will follow-up with his primary care physician regarding evaluation and treatment for his obstructive sleep apnea.  New diagnosis coverage for GLP-1 Zepbound weight reduction shots will be addressed by his primary care physician.    He will continue his other medications with the exception of increasing his Lipitor to 80 mg daily for LDL goal of less than 70 and closer to 40.    Would suggest also that he follows up with Dr. Berger electrophysiology and for pacemaker interrogation and follow-up.    Refills were provided.    Exercise dietary program.    Hydration.    [employer] portal use was encouraged.    We will plan to see back in 6 months with Laboratory Studies and ECG as ordered.     Patient will follow up with their primary physician for general care.    The patient knows to contact medical care earlier if need be.      ALLERGIES:     Patient has no known allergies.     MEDICATIONS:     Current Outpatient Medications   Medication Instructions   • acetaminophen (Tylenol Arthritis Pain) 650 mg ER tablet take 2 tablets as needed   • aspirin 81 mg, Daily   • atorvastatin (LIPITOR) 40 mg, Nightly   • digoxin (LANOXIN) 125 mcg, oral, Every other day   • famotidine (Pepcid) 20 mg tablet 1 tablet, 2 times daily   • hydroCHLOROthiazide (HYDRODIURIL) 25 mg, oral, Daily   • ibuprofen 600 mg tablet 1 tablet, 2 times daily (morning and late afternoon)   • levothyroxine (Synthroid, Levoxyl) 75 mcg tablet 1 tablet, Daily   • lisinopril 20 mg tablet 1 tablet, Daily   • metoprolol succinate XL (TOPROL-XL) 100 mg, oral, Daily   • sertraline (Zoloft) 100 mg tablet 1.5 tablets, Daily   • sotalol (BETAPACE) 120 mg, oral, 2 times daily   • Xarelto 20 mg, oral, Daily, Restart on 7/11/2025       ELECTROCARDIOGRAM:      Atrial paced rhythm, incomplete right bundle branch block, rate 71.    CARDIAC TESTING:      Lexiscan Myoview stress  test, 6/2025:  Abnormal Lexiscan Myoview cardiac perfusion stress test.  Moderate sized inferior reversible perfusion defect consistent with moderate inferior ischemia.   No myocardial infarction by perfusion imaging.   Normal left ventricular systolic function, ejection fraction 61%.   No comparison study available.    Echocardiogram, 6/2025:   1. Normal echocardiogram.   2. Left ventricular ejection fraction is normal by visual estimate at 65%.   3. Normal chamber sizes.   4. No significant valvular heart disease.   5. Normal right ventricular global systolic function.   6. RVSP is within normal limits at 18 mmHg.   7. No significant changes from prior 3/2020 study.    Cardiac catheterization, 7/2025:   1. Left Main Coronary Artery: This artery is normal.   2. Left Anterior Descending Artery: This artery is normal.   3. Circumflex Coronary Artery: normal.   4. Right Coronary Artery: is normal.   5. The Left Ventricular Ejection Fraction is 55%.    LABORATORY DATA:      CBC:   Lab Results   Component Value Date    WBC 9.3 07/10/2025    RBC 4.94 07/10/2025    HGB 15.4 07/10/2025    HCT 45.4 07/10/2025     07/10/2025        CMP:    Lab Results   Component Value Date     07/10/2025    K 4.2 07/10/2025     07/10/2025    CO2 25 07/10/2025    BUN 22 07/10/2025    CREATININE 1.38 (H) 07/10/2025    GLUCOSE 119 (H) 07/10/2025    CALCIUM 9.3 07/10/2025     PT/INR:    Lab Results   Component Value Date    PROTIME 11.0 07/10/2025    INR 1.0 07/10/2025                   PROBLEM LIST:     Problem List[1]          Mukesh Reed MD, [facility] /  Cardiology      Of Note:  Z[employer] voice recognition dictation software was utilized partially in the preparation of this note, therefore, inaccuracies in spelling, word choice and punctuation may have occurred which were not recognized at the time of signing.    Patient was seen and examined with total time of visit including chart preparation, rooming, and  chart completion exceeding 40 minutes.      I, Malina SANCHEZ RN am scribing for, and in the presence of Dr. Mukesh Reed MD, [facility].    I, Dr. Mukesh Reed MD, [facility], personally performed the services described in the documentation as scribed by Malina SANCHEZ RN in my presence, and confirm it is both accurate and complete.                [1]  Patient Active Problem List  Diagnosis   • Benign essential hypertension   • Hyperlipidemia   • Longstanding persistent atrial fibrillation (Multi)   • Morbidly obese (Multi)   • Near syncope   • Pacemaker   • Palpitations   • Sick sinus syndrome (Multi)   • SOB (shortness of breath) on exertion   • Anxiety   • Nephrolithiasis   • Osteoarthritis of ankle and foot   • Former smoker   • BMI 40.0-44.9, adult (Multi)   • Encounter to establish care   • Dizziness   • Chronic atrial fibrillation (Multi)   • NSVT (nonsustained ventricular tachycardia) (Multi)   • High risk medication use   • Obstructive sleep apnea   • Type 2 diabetes mellitus without complication, without long-term current use of insulin   • Chest pain   • Abnormal stress test   • Shortness of breath   • BMI 39.0-39.9,adult

## 2025-07-17 NOTE — PROGRESS NOTES
CARDIOLOGY OFFICE NOTE     Date:   7/17/2025    Patient:    Rishi Murphy    YOB: 1960    Primary Physician: Mason Aguiar DO         REASON FOR VISIT / CHIEF COMPLAINT:     Cardiology follow-up post testing    HPI:     Rishi Murphy was seen in cardiac evaluation at the [facility] Cardiology office July 17, 2025.      The patients problems are listed as in the impression below.    Electronic medical records reviewed.    Patient returns.  He has shortness of breath, chest pain and fatigue symptoms.  His chest pain symptoms have resolved currently on treatment.  He still has significant dyspnea on exertion.    He had abnormal stress test and therefore underwent cardiac catheterization which fortunately noted normal coronary arteries.  LVEF was 55%.    Echocardiogram was normal.    Patient denies Chest Pain, Lightheadedness, Dizziness, TIA or CVA symptoms.  No CHF or Edema.  No Palpitations.  No GI,  or Bleeding Issues. No Recent Fever or Chills.     He does have obstructive sleep apnea for which he is not intolerant of CPAP.  His primary care physician helps him with this.    Cardiovascular and general review of systems is otherwise negative.    A 14-system review is otherwise negative, other than noted.     PHYSICAL EXAMINATION:      Vitals:    07/17/25 0848   BP: 130/88   Pulse: 71     General: No acute distress.  Alert and oriented.  Head And Neck Examination: No jugular venous distention, no carotid bruits, no mass. Carotid upstrokes preserved. Oral mucosa moist.  No xanthelasma. Head and neck examination otherwise unremarkable.  Lungs: Clear to auscultation and percussion. No wheezes, no rales,  and no rhonchi.  Chest: Excursion appeared to be normal. No chest wall tenderness on palpation.  Pacemaker left chest.  Heart: Normal S1 and S2. No S3. No S4. No rub. Grade 1/6 systolic murmur, best heard at the left sternal border. Point of maximal impulse was within normal limits.  Abdomen: Soft.  Nontender. No organomegaly. No bruits. No masses.  Morbidly obese.  Extremities: No bipedal edema. No clubbing. No cyanosis.  Pulses are strong throughout. No bruits.  Musculoskeletal Exam: No ulcers, otherwise unremarkable.  Neuro: Neurologically appeared grossly intact.  .  IMPRESSION:        Cardiovascular status stable  Chronic fatigue  Shortness of breath  Chest pain, resolved the  Dizziness  Snoring  Nocturnal diaphoresis  Lower extremity paresthesias  Paroxysmal atrial fibrillation maintained in sinus rhythm  Sick sinus syndrome status post permanent pacemaker, Medtronic SR XT  MRI, implant 2018.  Nonsustained ventricular tachycardia history  High risk medications on sotalol, Xarelto  Long-term anticoagulation, Xarelto  Normal LV systolic function, LVEF 65%, echocardiogram 6/2025.  Moderate concentric left ventricular hypertrophy  Coronary artery disease with mild by catheterization 6/2018 and 7/2025.  False positive Lexiscan perfusion stress test, 6/2025.  Hypertension  Hyperlipidemia  Diabetes  Obstructive sleep apnea, not on CPAP  Hypothyroidism  Anxiety  History of kidney stones  Past tobacco abuse  Family history of atrial fibrillation  Morbid obesity  Otherwise as per assessment below.    RECOMMENDATIONS:      Patient overall is doing well.  He was reassured of the above findings.  He has still persistent shortness of breath fatigue and nocturnal diaphoresis with snoring.  This is most likely due to his obstructive sleep apnea and morbid obesity.  He will follow-up with his primary care physician regarding evaluation and treatment for his obstructive sleep apnea.  New diagnosis coverage for GLP-1 Zepbound weight reduction shots will be addressed by his primary care physician.    He will continue his other medications with the exception of increasing his Lipitor to 80 mg daily for LDL goal of less than 70 and closer to 40.    Would suggest also that he follows up with Dr. Berger electrophysiology and for  pacemaker interrogation and follow-up.    Refills were provided.    Exercise dietary program.    Hydration.    [employer] portal use was encouraged.    We will plan to see back in 6 months with Laboratory Studies and ECG as ordered.     Patient will follow up with their primary physician for general care.    The patient knows to contact medical care earlier if need be.      ALLERGIES:     Patient has no known allergies.     MEDICATIONS:     Current Outpatient Medications   Medication Instructions    acetaminophen (Tylenol Arthritis Pain) 650 mg ER tablet take 2 tablets as needed    aspirin 81 mg, Daily    atorvastatin (LIPITOR) 40 mg, Nightly    digoxin (LANOXIN) 125 mcg, oral, Every other day    famotidine (Pepcid) 20 mg tablet 1 tablet, 2 times daily    hydroCHLOROthiazide (HYDRODIURIL) 25 mg, oral, Daily    ibuprofen 600 mg tablet 1 tablet, 2 times daily (morning and late afternoon)    levothyroxine (Synthroid, Levoxyl) 75 mcg tablet 1 tablet, Daily    lisinopril 20 mg tablet 1 tablet, Daily    metoprolol succinate XL (TOPROL-XL) 100 mg, oral, Daily    sertraline (Zoloft) 100 mg tablet 1.5 tablets, Daily    sotalol (BETAPACE) 120 mg, oral, 2 times daily    Xarelto 20 mg, oral, Daily, Restart on 7/11/2025       ELECTROCARDIOGRAM:      Atrial paced rhythm, incomplete right bundle branch block, rate 71.    CARDIAC TESTING:      Lexiscan Myoview stress test, 6/2025:  Abnormal Lexiscan Myoview cardiac perfusion stress test.  Moderate sized inferior reversible perfusion defect consistent with moderate inferior ischemia.   No myocardial infarction by perfusion imaging.   Normal left ventricular systolic function, ejection fraction 61%.   No comparison study available.    Echocardiogram, 6/2025:   1. Normal echocardiogram.   2. Left ventricular ejection fraction is normal by visual estimate at 65%.   3. Normal chamber sizes.   4. No significant valvular heart disease.   5. Normal right ventricular global systolic  function.   6. RVSP is within normal limits at 18 mmHg.   7. No significant changes from prior 3/2020 study.    Cardiac catheterization, 7/2025:   1. Left Main Coronary Artery: This artery is normal.   2. Left Anterior Descending Artery: This artery is normal.   3. Circumflex Coronary Artery: normal.   4. Right Coronary Artery: is normal.   5. The Left Ventricular Ejection Fraction is 55%.    LABORATORY DATA:      CBC:   Lab Results   Component Value Date    WBC 9.3 07/10/2025    RBC 4.94 07/10/2025    HGB 15.4 07/10/2025    HCT 45.4 07/10/2025     07/10/2025        CMP:    Lab Results   Component Value Date     07/10/2025    K 4.2 07/10/2025     07/10/2025    CO2 25 07/10/2025    BUN 22 07/10/2025    CREATININE 1.38 (H) 07/10/2025    GLUCOSE 119 (H) 07/10/2025    CALCIUM 9.3 07/10/2025     PT/INR:    Lab Results   Component Value Date    PROTIME 11.0 07/10/2025    INR 1.0 07/10/2025                   PROBLEM LIST:     Problem List[1]          Mukesh Reed MD, FACC   [facility] /  Cardiology      Of Note:  [employer] voice recognition dictation software was utilized partially in the preparation of this note, therefore, inaccuracies in spelling, word choice and punctuation may have occurred which were not recognized at the time of signing.    Patient was seen and examined with total time of visit including chart preparation, rooming, and chart completion exceeding 40 minutes.      IMalina RN am scribing for, and in the presence of Dr. Mukesh Reed MD, FACC.    I, Dr. Mukesh Reed MD, FACC, personally performed the services described in the documentation as scribed by Malina SANCHEZ RN in my presence, and confirm it is both accurate and complete.              [1]   Patient Active Problem List  Diagnosis    Benign essential hypertension    Hyperlipidemia    Longstanding persistent atrial fibrillation (Multi)    Morbidly obese (Multi)    Near syncope    Pacemaker    Palpitations     Sick sinus syndrome (Multi)    SOB (shortness of breath) on exertion    Anxiety    Nephrolithiasis    Osteoarthritis of ankle and foot    Former smoker    BMI 40.0-44.9, adult (Multi)    Encounter to establish care    Dizziness    Chronic atrial fibrillation (Multi)    NSVT (nonsustained ventricular tachycardia) (Multi)    High risk medication use    Obstructive sleep apnea    Type 2 diabetes mellitus without complication, without long-term current use of insulin    Chest pain    Abnormal stress test    Shortness of breath    BMI 39.0-39.9,adult

## 2025-07-17 NOTE — PATIENT INSTRUCTIONS
Please bring all medicines, vitamins, and herbal supplements with you when you come to the office.    Prescriptions will not be filled unless you are compliant with your follow up appointments or have a follow up appointment scheduled as per instruction of your physician. Refills should be requested at the time of your visit.     BMI was above normal measurement. Current weight: 136 kg (300 lb 6.4 oz)  Weight change since last visit (-) denotes wt loss -2.51 lbs   Weight loss needed to achieve BMI 25: 111.3 Lbs  Weight loss needed to achieve BMI 30: 73.5 Lbs  Provided instructions on dietary changes  Provided instructions on exercise.    Check with family doctor about being prescribed sleep apnea to discuss med. Per Dr. Reed you may qualify due to having sleep apnea

## 2026-01-14 ENCOUNTER — APPOINTMENT (OUTPATIENT)
Dept: CARDIOLOGY | Facility: CLINIC | Age: 66
End: 2026-01-14
Payer: MEDICARE

## 2026-01-15 ENCOUNTER — APPOINTMENT (OUTPATIENT)
Dept: CARDIOLOGY | Facility: CLINIC | Age: 66
End: 2026-01-15
Payer: MEDICARE

## (undated) DEVICE — TUBING, MANIFOLD, LOW PRESSURE

## (undated) DEVICE — CATHETER, OPTITORQUE, 5FR, JACKY, 3.5/ 2H/110CM, CURVED

## (undated) DEVICE — TR BAND, RADIAL COMPRESSION, LONG, 29CM

## (undated) DEVICE — SHEATH, GLIDESHEATH, SLENDER, 6FR 10CM